# Patient Record
Sex: MALE | Race: WHITE | NOT HISPANIC OR LATINO | Employment: FULL TIME | URBAN - METROPOLITAN AREA
[De-identification: names, ages, dates, MRNs, and addresses within clinical notes are randomized per-mention and may not be internally consistent; named-entity substitution may affect disease eponyms.]

---

## 2023-06-20 ENCOUNTER — EVALUATION (OUTPATIENT)
Age: 48
End: 2023-06-20
Payer: COMMERCIAL

## 2023-06-20 DIAGNOSIS — M17.12 PRIMARY OSTEOARTHRITIS OF LEFT KNEE: ICD-10-CM

## 2023-06-20 DIAGNOSIS — M17.11 PRIMARY OSTEOARTHRITIS OF RIGHT KNEE: ICD-10-CM

## 2023-06-20 DIAGNOSIS — Z96.651 STATUS POST RIGHT PARTIAL KNEE REPLACEMENT: Primary | ICD-10-CM

## 2023-06-20 DIAGNOSIS — M17.0 BILATERAL PRIMARY OSTEOARTHRITIS OF KNEE: ICD-10-CM

## 2023-06-20 PROCEDURE — 97110 THERAPEUTIC EXERCISES: CPT

## 2023-06-20 PROCEDURE — 97112 NEUROMUSCULAR REEDUCATION: CPT

## 2023-06-20 PROCEDURE — 97161 PT EVAL LOW COMPLEX 20 MIN: CPT

## 2023-06-20 NOTE — LETTER
2023    Kevin Pitts MD  [address] [place] [address]    Patient: Abi Cheung   YOB: 1975   Date of Visit: 2023     Encounter Diagnosis     ICD-10-CM    1  Status post right partial knee replacement  Z96 651       2  Bilateral primary osteoarthritis of knee  M17 0       3  Primary osteoarthritis of right knee  M17 11       4  Primary osteoarthritis of left knee  M17 12           Dear Dr Michelle Sat:    Thank you for your recent referral of Abi Cheung  Please review the attached evaluation summary from Talha's recent visit  Please verify that you agree with the plan of care by signing the attached order  If you have any questions or concerns, please do not hesitate to call  I sincerely appreciate the opportunity to share in the care of one of your patients and hope to have another opportunity to work with you in the near future  Sincerely,    Chelly Pond, PT      Referring Provider:      I certify that I have read the below Plan of Care and certify the need for these services furnished under this plan of treatment while under my care  Kevin Pitts MD  [facility]  Via Fax: [phone]          PT Evaluation     Today's date: 2023  Patient name: Abi Cheung  : 1975  MRN: [id number]  Referring provider: Anselmo Odom*  Dx:   Encounter Diagnosis     ICD-10-CM    1  Status post right partial knee replacement  Z96 651       2  Bilateral primary osteoarthritis of knee  M17 0       3  Primary osteoarthritis of right knee  M17 11       4  Primary osteoarthritis of left knee  M17 12           Start Time: 0900  Stop Time: 1000  Total time in clinic (min): 60 minutes    Assessment  Assessment details: Patient is a 45 yo male presenting to physical therapy s/p R partial knee replacement on 23   Patient presents with decreased R knee ROM, decrease hip and knee strength and gait deviations such as decreased knee extension with heel strike and amb with RW  Patient has increased difficulty with walking, standing, stairs and transfers  PT will address the noted impairments by performing hip and knee strengthening, stretching, balance, functional activities and manual techniques to allow the patient to return to his PLOF  PT recommended 2x/week for 6-8 weeks c a good prognosis 2* PLOF  Impairments: abnormal gait, abnormal or restricted ROM, activity intolerance, impaired balance, impaired physical strength, lacks appropriate home exercise program, pain with function, poor posture  and poor body mechanics    Symptom irritability: lowUnderstanding of Dx/Px/POC: good   Prognosis: good    Goals  Post-Operative Goals:  STG: In four weeks the patient will:    1  Be (I) with his HEP  2  Increase hip and knee strength to 4+/5 MMT score to assist c ADLs  3  Increase R knee ROM by 15 degrees to assist c walking and transfers  4  amb with a RW for 200ft with mod (I) with proper gait mechanics  5  amb with a SPC for 200ft with mod (I) with proper gait mechanics  6  Negotiate 14 steps with proper mechanics with a SPC to assist c navigating his second floor of his home       LTG: In eight weeks, the patient will:    1  Increase FOTO score to 68 to demonstrate improvements in symptoms and function  2  Demonstrate full R knee AROM without pain  3  Negotiate a full set of stairs at his home without pain  4  Increase hip and knee strength to 5/5 MMT score to assist c prolonged walking and stairs  5  amb (I) for 200ft with proper gait mechanics  6  Return to work         Plan  Patient would benefit from: skilled physical therapy and PT eval  Planned modality interventions: cryotherapy and thermotherapy: hydrocollator packs  Planned therapy interventions: abdominal trunk stabilization, joint mobilization, manual therapy, massage, Minaya taping, neuromuscular re-education, "patient education, postural training, balance, body mechanics training, breathing training, strengthening, stretching, therapeutic activities, therapeutic exercise, transfer training, home exercise program, gait training, functional ROM exercises and flexibility  Frequency: 2x week  Duration in visits: 16  Duration in weeks: 8  Plan of Care beginning date: 2023  Plan of Care expiration date: 8/15/2023  Treatment plan discussed with: patient        Subjective Evaluation    History of Present Illness  Mechanism of injury: Patient noted since he was young he would always get fluid drawn out of the R knee  Patient noted prior to the surgery he was bone on the medial side of the knee  Patient is s/p R medial partial knee replacement on 23  Patient noted no complications from surgery  Patient currently is having increased difficulty with walking, steps, car and bed transfers  Patient noted no numbness or tingling  Pain  Current pain ratin  At best pain ratin (sitting with it elevated)  At worst pain ratin  Location: R medial knee  Quality: dull ache  Relieving factors: medications  Aggravating factors: standing, walking, stair climbing, sitting and lifting  Progression: worsening    Social Support  Steps to enter house: yes  1  Stairs in house: yes   14  Lives in: multiple-level home  Lives with: spouse and adult children (13 yo daughter)    Employment status: working (Maintenance, 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    Patient Goals  Patient goals for therapy: increased strength, independence with ADLs/IADLs, return to sport/leisure activities, increased motion, improved balance, decreased pain, decreased edema and return to work  Patient goal: \"to return to work  \" \"walk without pain  \" \"to hunt and fish without pain  \"         Objective     Observations     Right Knee   Positive for incision  Negative for drainage and edema       Additional Observation Details  Patient presents with healing " "incision present on the R knee with steri-strips/gauze       Neurological Testing     Sensation     Knee   Left Knee   Intact: light touch    Right Knee   Intact: light touch     Active Range of Motion     Right Knee   Flexion: 85 degrees with pain  Extension: 7 degrees with pain  Extensor la degrees with pain    Additional Active Range of Motion Details  Resting at 10 degrees extension    Passive Range of Motion     Right Knee   Flexion: 86 degrees with pain  Extension: 5 degrees with pain    Mobility   Patellar Mobility:     Right Knee   Hypomobile: medial, lateral, superior and inferior     Strength/Myotome Testing     Left Hip   Planes of Motion   Flexion: 4+  Extension: 4+  Abduction: 4+  Adduction: 4+    Right Hip   Planes of Motion   Flexion: 4  Extension: 4  Abduction: 4  Adduction: 4    Left Knee   Flexion: 5  Extension: 5    Right Knee   Flexion: 3  Extension: 3-  Quadriceps contraction: fair    Left Ankle/Foot   Dorsiflexion: 4-  Plantar flexion: 4-    Right Ankle/Foot   Dorsiflexion: 4  Plantar flexion: 4    Additional Strength Details  Patient able to perform x10 SLR with extensor lag    Patient amb with RW      Flowsheet Rows    Flowsheet Row Most Recent Value   PT/OT G-Codes    Current Score 35   Projected Score 68   Assessment Type Evaluation            Precautions: s/p R partial TKA on 23      Manuals             Patellar mobs nv            R knee PROM nv                                      Neuro Re-Ed             HEP edu MW            SLR x10            Quad set x10  5\" hold            clams nv            bridge nv                                      Ther Ex             X-ride nv            Hamstring stretch nv            Calf stretch 3x30\"            Ankle pumps/ heel raises x10            Heel slides x20            Hip abd supine x10            Hip add nv                         Ther Activity             Sit to stands nv            Step ups nv            Gait Training           " amb with SPC nv                         Modalities

## 2023-06-20 NOTE — PROGRESS NOTES
PT Evaluation     Today's date: 2023  Patient name: Tawanda Helm  : 1975  MRN: [id number]  Referring provider: Gigi Rascon*  Dx:   Encounter Diagnosis     ICD-10-CM    1  Status post right partial knee replacement  Z96 651       2  Bilateral primary osteoarthritis of knee  M17 0       3  Primary osteoarthritis of right knee  M17 11       4  Primary osteoarthritis of left knee  M17 12           Start Time: 0900  Stop Time: 1000  Total time in clinic (min): 60 minutes    Assessment  Assessment details: Patient is a 45 yo male presenting to physical therapy s/p R partial knee replacement on 23  Patient presents with decreased R knee ROM, decrease hip and knee strength and gait deviations such as decreased knee extension with heel strike and amb with RW  Patient has increased difficulty with walking, standing, stairs and transfers  PT will address the noted impairments by performing hip and knee strengthening, stretching, balance, functional activities and manual techniques to allow the patient to return to his PLOF  PT recommended 2x/week for 6-8 weeks c a good prognosis 2* PLOF  Impairments: abnormal gait, abnormal or restricted ROM, activity intolerance, impaired balance, impaired physical strength, lacks appropriate home exercise program, pain with function, poor posture  and poor body mechanics    Symptom irritability: lowUnderstanding of Dx/Px/POC: good   Prognosis: good    Goals  Post-Operative Goals:  STG: In four weeks the patient will:    1  Be (I) with his HEP  2  Increase hip and knee strength to 4+/5 MMT score to assist c ADLs  3  Increase R knee ROM by 15 degrees to assist c walking and transfers  4  amb with a RW for 200ft with mod (I) with proper gait mechanics  5  amb with a SPC for 200ft with mod (I) with proper gait mechanics     6  Negotiate 14 steps with proper mechanics with a SPC to assist c navigating his second floor of his home       LTG: In eight weeks, the patient will:    1  Increase FOTO score to 68 to demonstrate improvements in symptoms and function  2  Demonstrate full R knee AROM without pain  3  Negotiate a full set of stairs at his home without pain  4  Increase hip and knee strength to 5/5 MMT score to assist c prolonged walking and stairs  5  amb (I) for 200ft with proper gait mechanics  6  Return to work  Plan  Patient would benefit from: skilled physical therapy and PT eval  Planned modality interventions: cryotherapy and thermotherapy: hydrocollator packs  Planned therapy interventions: abdominal trunk stabilization, joint mobilization, manual therapy, massage, Minaya taping, neuromuscular re-education, patient education, postural training, balance, body mechanics training, breathing training, strengthening, stretching, therapeutic activities, therapeutic exercise, transfer training, home exercise program, gait training, functional ROM exercises and flexibility  Frequency: 2x week  Duration in visits: 16  Duration in weeks: 8  Plan of Care beginning date: 2023  Plan of Care expiration date: 8/15/2023  Treatment plan discussed with: patient        Subjective Evaluation    History of Present Illness  Mechanism of injury: Patient noted since he was young he would always get fluid drawn out of the R knee  Patient noted prior to the surgery he was bone on the medial side of the knee  Patient is s/p R medial partial knee replacement on 23  Patient noted no complications from surgery  Patient currently is having increased difficulty with walking, steps, car and bed transfers  Patient noted no numbness or tingling     Pain  Current pain ratin  At best pain ratin (sitting with it elevated)  At worst pain ratin  Location: R medial knee  Quality: dull ache  Relieving factors: medications  Aggravating factors: standing, walking, stair climbing, sitting and lifting  Progression: worsening    Social Support  Steps to enter "house: yes  1  Stairs in house: yes   14  Lives in: multiple-level home  Lives with: spouse and adult children (13 yo daughter)    Employment status: working (Maintenance, 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    Patient Goals  Patient goals for therapy: increased strength, independence with ADLs/IADLs, return to sport/leisure activities, increased motion, improved balance, decreased pain, decreased edema and return to work  Patient goal: \"to return to work  \" \"walk without pain  \" \"to hunt and fish without pain  \"         Objective     Observations     Right Knee   Positive for incision  Negative for drainage and edema  Additional Observation Details  Patient presents with healing incision present on the R knee with steri-strips/gauze       Neurological Testing     Sensation     Knee   Left Knee   Intact: light touch    Right Knee   Intact: light touch     Active Range of Motion     Right Knee   Flexion: 85 degrees with pain  Extension: 7 degrees with pain  Extensor la degrees with pain    Additional Active Range of Motion Details  Resting at 10 degrees extension    Passive Range of Motion     Right Knee   Flexion: 86 degrees with pain  Extension: 5 degrees with pain    Mobility   Patellar Mobility:     Right Knee   Hypomobile: medial, lateral, superior and inferior     Strength/Myotome Testing     Left Hip   Planes of Motion   Flexion: 4+  Extension: 4+  Abduction: 4+  Adduction: 4+    Right Hip   Planes of Motion   Flexion: 4  Extension: 4  Abduction: 4  Adduction: 4    Left Knee   Flexion: 5  Extension: 5    Right Knee   Flexion: 3  Extension: 3-  Quadriceps contraction: fair    Left Ankle/Foot   Dorsiflexion: 4-  Plantar flexion: 4-    Right Ankle/Foot   Dorsiflexion: 4  Plantar flexion: 4    Additional Strength Details  Patient able to perform x10 SLR with extensor lag    Patient amb with RW      Flowsheet Rows    Flowsheet Row Most Recent Value   PT/OT G-Codes    Current Score 35   Projected Score 68 " "  Assessment Type Evaluation             Precautions: s/p R partial TKA on 6/20/23      Manuals 6/20            Patellar mobs nv            R knee PROM nv                                      Neuro Re-Ed             HEP edu MW            SLR x10            Quad set x10  5\" hold            clams nv            bridge nv                                      Ther Ex             X-ride nv            Hamstring stretch nv            Calf stretch 3x30\"            Ankle pumps/ heel raises x10            Heel slides x20            Hip abd supine x10            Hip add nv                         Ther Activity             Sit to stands nv            Step ups nv            Gait Training             amb with SPC nv                         Modalities                                          "

## 2023-06-23 ENCOUNTER — OFFICE VISIT (OUTPATIENT)
Age: 48
End: 2023-06-23
Payer: COMMERCIAL

## 2023-06-23 DIAGNOSIS — Z96.651 STATUS POST RIGHT PARTIAL KNEE REPLACEMENT: Primary | ICD-10-CM

## 2023-06-23 DIAGNOSIS — M17.0 BILATERAL PRIMARY OSTEOARTHRITIS OF KNEE: ICD-10-CM

## 2023-06-23 DIAGNOSIS — M17.11 PRIMARY OSTEOARTHRITIS OF RIGHT KNEE: ICD-10-CM

## 2023-06-23 DIAGNOSIS — M17.12 PRIMARY OSTEOARTHRITIS OF LEFT KNEE: ICD-10-CM

## 2023-06-23 PROCEDURE — 97112 NEUROMUSCULAR REEDUCATION: CPT

## 2023-06-23 PROCEDURE — 97110 THERAPEUTIC EXERCISES: CPT

## 2023-06-23 RX ORDER — ROSUVASTATIN CALCIUM 20 MG/1
20 TABLET, COATED ORAL DAILY
COMMUNITY

## 2023-06-23 RX ORDER — OMEGA-3S/DHA/EPA/FISH OIL/D3 300MG-1000
400 CAPSULE ORAL DAILY
COMMUNITY

## 2023-06-23 RX ORDER — EZETIMIBE 10 MG/1
10 TABLET ORAL DAILY
COMMUNITY

## 2023-06-23 NOTE — PROGRESS NOTES
"Daily Note     Today's date: 2023  Patient name: Tawanda Helm  : 1975  MRN: [id number]  Referring provider: Gigi Rascon*  Dx:   Encounter Diagnosis     ICD-10-CM    1  Status post right partial knee replacement  Z96 651       2  Bilateral primary osteoarthritis of knee  M17 0       3  Primary osteoarthritis of right knee  M17 11       4  Primary osteoarthritis of left knee  M17 12           Start Time: 945  Stop Time: 0  Total time in clinic (min): 45 minutes    Subjective: Patient noted after last session his nerve block wore off and he had increased pain  Patient is now taking his pain medicine on a routine basis  Patient noted a 4/10 pain at the start of the session  Objective: See treatment diary below      Assessment: Patient performed X-ride  aerobic exercise to increase blood flow to the area being treated, prepare the muscles for strength training and stretching, improve overall tolerance to activity, and aerobic endurance  PT introduced seated marching and LAQ to assist c ROM and strength  Patient required min VCs for form throughout the session  Patient improved with ROM during the session after manual PROM  Patient had changed his dressing at home yesterday and presented to the clinic with gauze over the healing incision  PT added to his HEP  Patient would benefit from continued PT to allow the patient to return to his PLOF  Plan: Continue per plan of care        Precautions: s/p R partial TKA on 23      Manuals            Patellar mobs nv            R knee PROM nv MW                                     Neuro Re-Ed             HEP edu EVELIO FRASER           SLR x10 x10 ea           Quad set x10  5\" hold x20  5\" hold           clams nv            bridge nv                                      Ther Ex             X-ride nv 10' rocking           Hamstring stretch nv            Calf stretch 3x30\" 3x30\"            Ankle pumps/ heel raises x10          " "  Heel slides x20 x20  5\" hold           Hip abd supine x10            Hip add nv            Seated marching  x10 ea           LAQ with other foot assist/ seated knee flexion with other foot assistance  x10           Ther Activity             Sit to stands nv            Step ups nv            Gait Training             amb with SPC nv RW  1 lap                        Modalities             CP  10'                             "

## 2023-06-26 ENCOUNTER — OFFICE VISIT (OUTPATIENT)
Age: 48
End: 2023-06-26
Payer: COMMERCIAL

## 2023-06-26 DIAGNOSIS — M17.12 PRIMARY OSTEOARTHRITIS OF LEFT KNEE: ICD-10-CM

## 2023-06-26 DIAGNOSIS — M17.11 PRIMARY OSTEOARTHRITIS OF RIGHT KNEE: ICD-10-CM

## 2023-06-26 DIAGNOSIS — M17.0 BILATERAL PRIMARY OSTEOARTHRITIS OF KNEE: ICD-10-CM

## 2023-06-26 DIAGNOSIS — Z96.651 STATUS POST RIGHT PARTIAL KNEE REPLACEMENT: Primary | ICD-10-CM

## 2023-06-26 PROCEDURE — 97112 NEUROMUSCULAR REEDUCATION: CPT

## 2023-06-26 PROCEDURE — 97110 THERAPEUTIC EXERCISES: CPT

## 2023-06-26 PROCEDURE — 97530 THERAPEUTIC ACTIVITIES: CPT

## 2023-06-26 NOTE — PROGRESS NOTES
"Daily Note     Today's date: 2023  Patient name: Otilia Bowser  : 1975  MRN: [id number]  Referring provider: Asuncion Manzanares*  Dx:   Encounter Diagnosis     ICD-10-CM    1  Status post right partial knee replacement  Z96 651       2  Bilateral primary osteoarthritis of knee  M17 0       3  Primary osteoarthritis of right knee  M17 11       4  Primary osteoarthritis of left knee  M17 12           Start Time: 1130  Stop Time: 1233  Total time in clinic (min): 63 minutes    Subjective: Patient noted a 5/10 pain at the start of the session  Patient noted that he took multiple short walks over the weekend and presents with amb with a small base quad cane for his AD  Objective: See treatment diary below      Assessment: Patient performed Nustep aerobic exercise to increase blood flow to the area being treated, prepare the muscles for strength training and stretching, improve overall tolerance to activity, and aerobic endurance  Patient negotiate a 4\" and 6\" step up and down during the session with his cane  Patient was able to negotiate steps with minimal cues and safely  PT educated the patient on negotiating steps at home  PT introduced hip add and abduction in a sidelying position to assist c gait mechanics  Patient required min VCs for proper cane negotiation  Patient continues to improve with ROM after manual PROM  PT educated the patient on his HEP  Patient would benefit from continued PT to allow the patient to return to his PLOF  Plan: Continue per plan of care        Precautions: s/p R partial TKA on 23  R knee AROM as of 23: 0-106 degrees  R knee PROM as of 23: 0-116 degrees    Manuals           Patellar mobs nv            R knee PROM nv MW MW                                    Neuro Re-Ed             HEP edu MW MW MW          SLR x10 x10 ea x15 ea          Quad set x10  5\" hold x20  5\" hold           clams nv            bridge nv              " "                        Ther Ex             X-ride nv 10' rocking Nu step  10'  Seat 10          Hamstring stretch nv            Calf stretch 3x30\" 3x30\"            Ankle pumps/ heel raises x10            Heel slides x20 x20  5\" hold x20  5\" hold          Hip abd supine x10  sidelying  x10 ea          Hip add nv  x20  5\" hold          Seated marching  x10 ea x20 ea          LAQ with other foot assist/ seated knee flexion with other foot assistance  x10 x20          Ther Activity             Sit to stands nv            Step ups nv  Fwd ad down  4\" and 6\"  x20 ea, cane edu          Gait Training             amb with SPC nv RW  1 lap 1 lap SPC with cues                       Modalities             CP  10' 10'                            "

## 2023-06-29 ENCOUNTER — OFFICE VISIT (OUTPATIENT)
Age: 48
End: 2023-06-29
Payer: COMMERCIAL

## 2023-06-29 DIAGNOSIS — M17.11 PRIMARY OSTEOARTHRITIS OF RIGHT KNEE: ICD-10-CM

## 2023-06-29 DIAGNOSIS — M17.0 BILATERAL PRIMARY OSTEOARTHRITIS OF KNEE: ICD-10-CM

## 2023-06-29 DIAGNOSIS — M17.12 PRIMARY OSTEOARTHRITIS OF LEFT KNEE: ICD-10-CM

## 2023-06-29 DIAGNOSIS — Z96.651 STATUS POST RIGHT PARTIAL KNEE REPLACEMENT: Primary | ICD-10-CM

## 2023-06-29 PROCEDURE — 97140 MANUAL THERAPY 1/> REGIONS: CPT

## 2023-06-29 PROCEDURE — 97110 THERAPEUTIC EXERCISES: CPT

## 2023-06-29 PROCEDURE — 97530 THERAPEUTIC ACTIVITIES: CPT

## 2023-06-29 NOTE — PROGRESS NOTES
"Daily Note     Today's date: 2023  Patient name: José Miguel Acosta  : 1975  MRN: [id number]  Referring provider: Navid Guerra*  Dx:   Encounter Diagnosis     ICD-10-CM    1  Status post right partial knee replacement  Z96 651       2  Bilateral primary osteoarthritis of knee  M17 0       3  Primary osteoarthritis of right knee  M17 11       4  Primary osteoarthritis of left knee  M17 12           Start Time: 1745  Stop Time: 1830  Total time in clinic (min): 45 minutes    Subjective: Patient noted a 3/10 pain at the start of the session  Patient noted that he followed up with his surgeon and they noted good progress  Patient noted he is walking with the [facility]  Patient did not try the steps at home yet  Objective: See treatment diary below      Assessment: Patient performed Nustep aerobic exercise to increase blood flow to the area being treated, prepare the muscles for strength training and stretching, improve overall tolerance to activity, and aerobic endurance  Patient performed step downs with 2 HHA and noted some pain  Patient performed sit to stands with min VCs for form  PT educated the patient on steps at home and his HEP  Patient would benefit from continued PT to allow the patient to return to his PLOF  Plan: Continue per plan of care        Precautions: s/p R partial TKA on 23  R knee AROM as of 23: 0-106 degrees  R knee PROM as of 23: 0-116 degrees    Manuals          Patellar mobs nv   MW  Grade  III         R knee PROM nv MW MW MW                                   Neuro Re-Ed             HEP edu MW MW MW MW         SLR x10 x10 ea x15 ea 2x10 ea         Quad set x10  5\" hold x20  5\" hold           clams nv            bridge nv                                      Ther Ex             X-ride nv 10' rocking Nu step  10'  Seat 10 Nu step  10' seat 9         Hamstring stretch nv            Calf stretch 3x30\" 3x30\"   @ foam roll  3x30\"     " "     Ankle pumps/ heel raises x10            Heel slides x20 x20  5\" hold x20  5\" hold x20  5\" hold         Hip abd supine x10  sidelying  x10 ea          Hip add nv  x20  5\" hold          Seated marching  x10 ea x20 ea x20 ea         LAQ with other foot assist/ seated knee flexion with other foot assistance  x10 x20 x20         Ther Activity             Sit to stands nv   x10 2 HHA         Step ups nv  Fwd ad down  4\" and 6\"  x20 ea, cane edu Fwd, down, lateral  6\"  x20 ea (B)         Gait Training             amb with SPC nv RW  1 lap 1 lap SPC with cues                       Modalities             CP  10' 10'                            "

## 2023-07-03 ENCOUNTER — OFFICE VISIT (OUTPATIENT)
Age: 48
End: 2023-07-03
Payer: COMMERCIAL

## 2023-07-03 DIAGNOSIS — M17.0 BILATERAL PRIMARY OSTEOARTHRITIS OF KNEE: ICD-10-CM

## 2023-07-03 DIAGNOSIS — M17.12 PRIMARY OSTEOARTHRITIS OF LEFT KNEE: ICD-10-CM

## 2023-07-03 DIAGNOSIS — Z96.651 STATUS POST RIGHT PARTIAL KNEE REPLACEMENT: Primary | ICD-10-CM

## 2023-07-03 DIAGNOSIS — M17.11 PRIMARY OSTEOARTHRITIS OF RIGHT KNEE: ICD-10-CM

## 2023-07-03 PROCEDURE — 97110 THERAPEUTIC EXERCISES: CPT

## 2023-07-03 NOTE — PROGRESS NOTES
Daily Note     Today's date: 7/3/2023  Patient name: Whitley Kelley  : 1975  MRN: [id number]  Referring provider: Dinesh Stanton*  Dx:   Encounter Diagnosis     ICD-10-CM    1. Status post right partial knee replacement  Z96.651       2. Bilateral primary osteoarthritis of knee  M17.0       3. Primary osteoarthritis of right knee  M17.11       4. Primary osteoarthritis of left knee  M17.12           Start Time: 1745  Stop Time: 1830  Total time in clinic (min): 45 minutes    Subjective: Patient noted he has a 4/10 pain at the start of the session. Patient went on walks this past weekend and was able to perform the step to pattern on the stairs this weekend. Objective: See treatment diary below      Assessment: Patient performed Nustep aerobic exercise to increase blood flow to the area being treated, prepare the muscles for strength training and stretching, improve overall tolerance to activity, and aerobic endurance. Patient required min VCs for form during the session. Patient performed sit to stands with 2 HHA. Patient performed 1 sit to stand without HHA and noted increased pain in the R knee. Patient was able to complete, however it was painful. PT introduced the leg press & prone quad stretch to assist c strength and ROM. Patient noted no increased pain with the leg press. PT educated the patient on his HEP. Patient would benefit from continued PT to allow the patient to return to his PLOF. Plan: Continue per plan of care.       Precautions: s/p R partial TKA on 23  R knee AROM as of 23: 0-106 degrees  R knee PROM as of 23: 0-116 degrees    Manuals  7/3        Patellar mobs nv   MW  Grade  III MW  Grade  III        R knee PROM nv MW MW MW MW                                  Neuro Re-Ed             HEP edu MW MW MW MW MW        SLR x10 x10 ea x15 ea 2x10 ea         Quad set x10  5" hold x20  5" hold           clams nv            bridge nv Ther Ex             X-ride nv 10' rocking Nu step  10'  Seat 10 Nu step  10' seat 9 Nu step  10' seat 8        Hamstring stretch nv    @ step  3x30" ea        Calf stretch 3x30" 3x30"   @ foam roll  3x30"  @ step  3x30" ea        Ankle pumps/ heel raises x10    HR  x20        Prone quad stetch     5x20"        Heel slides x20 x20  5" hold x20  5" hold x20  5" hold         Hip abd supine x10  sidelying  x10 ea          Hip add nv  x20  5" hold          Seated marching  x10 ea x20 ea x20 ea         Leg press     DL  65#  x20  SL  45#  x20 ea          LAQ with other foot assist/ seated knee flexion with other foot assistance  x10 x20 x20 x20        Ther Activity             Sit to stands nv   x10 2 HHA x10  2 HHA        Step ups nv  Fwd ad down  4" and 6"  x20 ea, cane edu Fwd, down, lateral  6"  x20 ea (B)         Gait Training             amb with SPC nv RW  1 lap 1 lap SPC with cues                       Modalities             CP  10' 10'

## 2023-07-06 ENCOUNTER — OFFICE VISIT (OUTPATIENT)
Age: 48
End: 2023-07-06
Payer: COMMERCIAL

## 2023-07-06 DIAGNOSIS — Z96.651 STATUS POST RIGHT PARTIAL KNEE REPLACEMENT: Primary | ICD-10-CM

## 2023-07-06 DIAGNOSIS — M17.0 BILATERAL PRIMARY OSTEOARTHRITIS OF KNEE: ICD-10-CM

## 2023-07-06 DIAGNOSIS — M17.12 PRIMARY OSTEOARTHRITIS OF LEFT KNEE: ICD-10-CM

## 2023-07-06 DIAGNOSIS — M17.11 PRIMARY OSTEOARTHRITIS OF RIGHT KNEE: ICD-10-CM

## 2023-07-06 PROCEDURE — 97530 THERAPEUTIC ACTIVITIES: CPT

## 2023-07-06 PROCEDURE — 97110 THERAPEUTIC EXERCISES: CPT

## 2023-07-06 NOTE — PROGRESS NOTES
Daily Note     Today's date: 2023  Patient name: Kate Kamara  : 1975  MRN: [id number]  Referring provider: Jeannie Brock*  Dx:   Encounter Diagnosis     ICD-10-CM    1. Status post right partial knee replacement  Z96.651       2. Bilateral primary osteoarthritis of knee  M17.0       3. Primary osteoarthritis of right knee  M17.11       4. Primary osteoarthritis of left knee  M17.12           Start Time: 1745  Stop Time:   Total time in clinic (min): 45 minutes    Subjective: Patient noted a 3/10 pain at the start of the session. Patient noted that he is not using the cane in his house. Patient only uses the cane with long distances out side. Objective: See treatment diary below      Assessment: Patient performed Nustep aerobic exercise to increase blood flow to the area being treated, prepare the muscles for strength training and stretching, improve overall tolerance to activity, and aerobic endurance. PT introduced TRX squats to assist c sit to stands. Patient required cues for knee flexion and weight distribution. Patient performed exercises with min VCs form, however is progressing. Patient amb without an AD with decreased heel strike. With cues this decreased. PT added to his HEP. Patient would benefit from continued PT to allow the patient to return to his PLOF. Plan: Continue per plan of care.       Precautions: s/p R partial TKA on 23  R knee AROM as of 23: 0-106 degrees  R knee PROM as of 23: 0-116 degrees    Manuals  7/3 7/6       Patellar mobs nv   MW  Grade  III MW  Grade  III MW  grade  III       R knee PROM nv MW MW MW MW MW       Quad rolling      MW edu                    Neuro Re-Ed             HEP edu MW MW MW MW MW MW       SLR x10 x10 ea x15 ea 2x10 ea         Quad set x10  5" hold x20  5" hold           clams nv            bridge nv                                      Ther Ex             X-ride nv 10' rocking Nu step  10'  Seat 10 Nu step  10' seat 9 Nu step  10' seat 8 Nu step  10' seat 8       Hamstring stretch nv    @ step  3x30" ea @ step  3x30" ea       Calf stretch 3x30" 3x30"   @ foam roll  3x30"  @ step  3x30" ea @ step  3x30" ea       Ankle pumps/ heel raises x10    HR  x20        Prone quad stetch     5x20" 5x20"       Heel slides x20 x20  5" hold x20  5" hold x20  5" hold  10x10" hold       Hip abd supine x10  sidelying  x10 ea          Hip add nv  x20  5" hold          Seated marching  x10 ea x20 ea x20 ea         Leg press     DL  65#  x20  SL  45#  x20 ea   DL  65#  x25  SL  45#  x20 ea  Plate 9       LAQ with other foot assist/ seated knee flexion with other foot assistance  x10 x20 x20 x20        Ther Activity             Sit to stands nv   x10 2 HHA x10  2 HHA        TRX squats      2x10       Step ups nv  Fwd ad down  4" and 6"  x20 ea, cane edu Fwd, down, lateral  6"  x20 ea (B)         Gait Training             amb with SPC nv RW  1 lap 1 lap SPC with cues   2 laps no AD                    Modalities             CP  10' 10'

## 2023-07-10 ENCOUNTER — EVALUATION (OUTPATIENT)
Age: 48
End: 2023-07-10
Payer: COMMERCIAL

## 2023-07-10 DIAGNOSIS — M17.11 PRIMARY OSTEOARTHRITIS OF RIGHT KNEE: ICD-10-CM

## 2023-07-10 DIAGNOSIS — M17.0 BILATERAL PRIMARY OSTEOARTHRITIS OF KNEE: ICD-10-CM

## 2023-07-10 DIAGNOSIS — Z96.651 STATUS POST RIGHT PARTIAL KNEE REPLACEMENT: Primary | ICD-10-CM

## 2023-07-10 DIAGNOSIS — M17.12 PRIMARY OSTEOARTHRITIS OF LEFT KNEE: ICD-10-CM

## 2023-07-10 PROCEDURE — 97164 PT RE-EVAL EST PLAN CARE: CPT

## 2023-07-10 PROCEDURE — 97140 MANUAL THERAPY 1/> REGIONS: CPT

## 2023-07-10 PROCEDURE — 97110 THERAPEUTIC EXERCISES: CPT

## 2023-07-10 NOTE — PROGRESS NOTES
PT Re-Evaluation     Today's date: 7/10/2023  Patient name: Awanda Koyanagi  : 1975  MRN: [id number]  Referring provider: Juvencio Hernández*  Dx:   Encounter Diagnosis     ICD-10-CM    1. Status post right partial knee replacement  Z96.651       2. Bilateral primary osteoarthritis of knee  M17.0       3. Primary osteoarthritis of right knee  M17.11       4. Primary osteoarthritis of left knee  M17.12           Start Time: 1745  Stop Time: 1830  Total time in clinic (min): 45 minutes    Assessment  Assessment details: Patient is a 47 yo male presenting to physical therapy s/p R partial knee replacement on 23. Since starting physical therarpy the patient has improved with strength, endurance, gait mechanics and activity tolerance. Patient is amb without an AD at home and in the community. Patient may use a SPC with amb in the yard at times. Patient is able to negotiate the steps with one hand rail in a receiprocal to step to pattern. Descending is more challenging. Patient requires 1 HHA for sit to stand transfers. Patient noted increased stiffness with prolonged sitting. Although improvements have been made the patient continues to have difficulty with ROM, strength, endurance, sit to stand transfers and prolonged walking. Patient continues to perform his HEP on a daily basis. PT will continue to address the noted impairments by performing hip and knee strengthening, stretching, balance, functional activities and manual techniques to allow the patient to return to his PLOF. PT recommended 2x/week for 4-6 weeks c a good prognosis 2* PLOF.     Impairments: abnormal gait, abnormal or restricted ROM, activity intolerance, impaired balance, impaired physical strength, lacks appropriate home exercise program, pain with function, poor posture  and poor body mechanics    Symptom irritability: lowUnderstanding of Dx/Px/POC: good   Prognosis: good    Goals  Post-Operative Goals:  STG: In four weeks the patient will:    1. Be (I) with his HEP. (in progress)  2. Increase hip and knee strength to 4+/5 MMT score to assist c ADLs. (in progress)  3. Increase R knee ROM by 15 degrees to assist c walking and transfers. (MET)  4. amb with a RW for 200ft with mod (I) with proper gait mechanics. (MET)  5. amb with a SPC for 200ft with mod (I) with proper gait mechanics. (MET)  6. Negotiate 14 steps with proper mechanics with a SPC to assist c navigating his second floor of his home (MET)      LTG: In eight weeks, the patient will:    1. Increase FOTO score to 68 to demonstrate improvements in symptoms and function. (in progress)  2. Demonstrate full R knee AROM without pain.(in progress)  3. Negotiate a full set of stairs at his home without pain.(in progress)  4. Increase hip and knee strength to 5/5 MMT score to assist c prolonged walking and stairs. (in progress)  5. amb (I) for 200ft with proper gait mechanics. (in progress)  6. Return to work. (in progress)      Plan  Patient would benefit from: skilled physical therapy and PT eval  Planned modality interventions: cryotherapy and thermotherapy: hydrocollator packs  Planned therapy interventions: abdominal trunk stabilization, joint mobilization, manual therapy, massage, Minaya taping, neuromuscular re-education, patient education, postural training, balance, body mechanics training, breathing training, strengthening, stretching, therapeutic activities, therapeutic exercise, transfer training, home exercise program, gait training, functional ROM exercises and flexibility  Frequency: 2x week  Duration in visits: 12  Duration in weeks: 6  Plan of Care beginning date: 7/10/2023  Plan of Care expiration date: 8/21/2023  Treatment plan discussed with: patient        Subjective Evaluation    History of Present Illness  Mechanism of injury: Patient is s/p R medial partial knee replacement on 6/19/23. Patient noted a 50% improvement since starting physical therapy.  Patient noted improvements with the following since starting PT: donning/doffing shoes, taking a shower, walking, standing, sleep. Patient noted he continues to be limited in descending the stairs, prolonged walking, standing and sitting, and sit to stands. Patient is able to ascend the stairs in a reciprocal pattern with one hand rail, descend the stairs reciprocal/ step to pattern with one hand rail, sit to stand transfers with one hand. Patient is able to sleep for 7 hours in a row. Patient noted he can sit for about 1-2 hours prior to getting up and moving around. Patient is able to walk the whole grocery store with some discomfort. Patient noted no difficulty with standing for period of time. Patient is still taking a percocet prior to PT, other than that he is not taking pain medications. Patient Goals  Patient goals for therapy: increased strength, independence with ADLs/IADLs, return to sport/leisure activities, increased motion, improved balance, decreased pain and return to work  Patient goal: "to return to work." (RTW in September) "walk without pain." (in progress) "to hunt and fish without pain." (in progress)  Pain  Current pain ratin  At best pain ratin  At worst pain ratin  Location: R medial knee  Quality: dull ache  Relieving factors: medications  Aggravating factors: standing, walking, stair climbing, sitting and lifting  Progression: improved    Social Support  Steps to enter house: yes  1  Stairs in house: yes   14  Lives in: multiple-level home  Lives with: spouse and adult children (13 yo daughter)    Employment status: working (Maintenance, 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          Objective     Observations     Right Knee   Negative for drainage, edema and incision. Additional Observation Details  Patient presents with healed incision present on the R knee with no steri-strips/gauze.          Neurological Testing     Sensation     Knee   Left Knee   Intact: light touch    Right Knee   Intact: light touch     Active Range of Motion     Right Knee   Flexion: 125 degrees with pain  Extension: 0 degrees   Extensor la degrees     Passive Range of Motion     Right Knee   Flexion: 125 degrees with pain  Extension: 0 degrees     Mobility   Patellar Mobility:     Right Knee   WFL: medial and lateral  Hypomobile: superior and inferior     Strength/Myotome Testing     Left Hip   Planes of Motion   Flexion: 4+  Extension: 4+  Abduction: 4+  Adduction: 4+    Right Hip   Planes of Motion   Flexion: 4+  Extension: 4+  Abduction: 4  Adduction: 4+    Left Knee   Flexion: 5  Extension: 5    Right Knee   Flexion: 4  Extension: 4  Quadriceps contraction: good    Left Ankle/Foot   Dorsiflexion: 4-  Plantar flexion: 4-    Right Ankle/Foot   Dorsiflexion: 4  Plantar flexion: 4    Additional Strength Details  Patient able to perform x10 SLR with extensor lag    Patient amb with RW             Precautions: s/p R partial TKA on 23  R knee AROM as of 7/10/23: 0-125 degrees  R knee PROM as of 7/10/23: 0-125 degrees    Manuals 6/20 6/23 6/26 6/29 7/3 7/6 7/10      Patellar mobs nv   MW  Grade  III MW  Grade  III MW  grade  III MW  grade  III      R knee PROM nv MW MW MW MW MW MW      RE       MW      Quad rolling      MW edu       Scar massage       Only superior healed portion  MW      Neuro Re-Ed             HEP edu MW MW MW MW MW MW MW      SLR x10 x10 ea x15 ea 2x10 ea         Quad set x10  5" hold x20  5" hold           clams nv            bridge nv                                      Ther Ex             X-ride nv 10' rocking Nu step  10'  Seat 10 Nu step  10' seat 9 Nu step  10' seat 8 Nu step  10' seat 8 Nu step  10' seat 8      Hamstring stretch nv    @ step  3x30" ea @ step  3x30" ea @ step  3x30" ea      Calf stretch 3x30" 3x30"   @ foam roll  3x30"  @ step  3x30" ea @ step  3x30" ea @ step  3x30" ea      Ankle pumps/ heel raises x10    HR  x20        Prone quad stetch     5x20" 5x20" 5x20"      Heel slides x20 x20  5" hold x20  5" hold x20  5" hold  10x10" hold 10x10" hold      Hip abd supine x10  sidelying  x10 ea          Hip add nv  x20  5" hold          Seated marching  x10 ea x20 ea x20 ea         Leg press     DL  65#  x20  SL  45#  x20 ea   DL  65#  x25  SL  45#  x20 ea  Plate 9 DL  18#  G93  SL  55#  x20 ea  Plate 9 Plate 8     LAQ with other foot assist/ seated knee flexion with other foot assistance  x10 x20 x20 x20  nv with weight      Ther Activity             Sit to stands nv   x10 2 HHA x10  2 HHA  nv      TRX squats      2x10 x20      Step ups nv  Fwd ad down  4" and 6"  x20 ea, cane edu Fwd, down, lateral  6"  x20 ea (B)   nv      Gait Training             amb with SPC nv RW  1 lap 1 lap SPC with cues   2 laps no AD                    Modalities             CP  10' 10'

## 2023-07-10 NOTE — LETTER
July 10, 2023    Vance Martinez MD  [address] [place] [id number]    Patient: Miguel Zamora   YOB: 1975   Date of Visit: 7/10/2023     Encounter Diagnosis     ICD-10-CM    1. Status post right partial knee replacement  Z96.651       2. Bilateral primary osteoarthritis of knee  M17.0       3. Primary osteoarthritis of right knee  M17.11       4. Primary osteoarthritis of left knee  M17.12           Dear Dr. Fredi Bhatt:    Thank you for your recent referral of Miguel Zamora. Please review the attached evaluation summary from Talha's recent visit. Please verify that you agree with the plan of care by signing the attached order. If you have any questions or concerns, please do not hesitate to call. I sincerely appreciate the opportunity to share in the care of one of your patients and hope to have another opportunity to work with you in the near future. Sincerely,    Sarwat Stein, PT      Referring Provider:      I certify that I have read the below Plan of Care and certify the need for these services furnished under this plan of treatment while under my care. Vance Martinez MD  [id number]  Via Fax: [phone]          PT Re-Evaluation     Today's date: 7/10/2023  Patient name: Miguel Zamora  : 1975  MRN: [id number]  Referring provider: Vance Martinez*  Dx:   Encounter Diagnosis     ICD-10-CM    1. Status post right partial knee replacement  Z96.651       2. Bilateral primary osteoarthritis of knee  M17.0       3. Primary osteoarthritis of right knee  M17.11       4. Primary osteoarthritis of left knee  M17.12           Start Time: 1745  Stop Time: 1830  Total time in clinic (min): 45 minutes    Assessment  Assessment details: Patient is a 45 yo male presenting to physical therapy s/p R partial knee replacement on 23.  Since starting physical therarpy the patient has improved with strength, endurance, gait mechanics and activity tolerance. Patient is amb without an AD at home and in the community. Patient may use a SPC with amb in the yard at times. Patient is able to negotiate the steps with one hand rail in a receiprocal to step to pattern. Descending is more challenging. Patient requires 1 HHA for sit to stand transfers. Patient noted increased stiffness with prolonged sitting. Although improvements have been made the patient continues to have difficulty with ROM, strength, endurance, sit to stand transfers and prolonged walking. Patient continues to perform his HEP on a daily basis. PT will continue to address the noted impairments by performing hip and knee strengthening, stretching, balance, functional activities and manual techniques to allow the patient to return to his PLOF. PT recommended 2x/week for 4-6 weeks c a good prognosis 2* PLOF. Impairments: abnormal gait, abnormal or restricted ROM, activity intolerance, impaired balance, impaired physical strength, lacks appropriate home exercise program, pain with function, poor posture  and poor body mechanics    Symptom irritability: lowUnderstanding of Dx/Px/POC: good   Prognosis: good    Goals  Post-Operative Goals:  STG: In four weeks the patient will:    1. Be (I) with his HEP. (in progress)  2. Increase hip and knee strength to 4+/5 MMT score to assist c ADLs. (in progress)  3. Increase R knee ROM by 15 degrees to assist c walking and transfers. (MET)  4. amb with a RW for 200ft with mod (I) with proper gait mechanics. (MET)  5. amb with a SPC for 200ft with mod (I) with proper gait mechanics. (MET)  6. Negotiate 14 steps with proper mechanics with a SPC to assist c navigating his second floor of his home (MET)      LTG: In eight weeks, the patient will:    1. Increase FOTO score to 68 to demonstrate improvements in symptoms and function. (in progress)  2. Demonstrate full R knee AROM without pain.(in progress)  3. Negotiate a full set of stairs at his home without pain.(in progress)  4. Increase hip and knee strength to 5/5 MMT score to assist c prolonged walking and stairs. (in progress)  5. amb (I) for 200ft with proper gait mechanics. (in progress)  6. Return to work. (in progress)      Plan  Patient would benefit from: skilled physical therapy and PT eval  Planned modality interventions: cryotherapy and thermotherapy: hydrocollator packs  Planned therapy interventions: abdominal trunk stabilization, joint mobilization, manual therapy, massage, Minaya taping, neuromuscular re-education, patient education, postural training, balance, body mechanics training, breathing training, strengthening, stretching, therapeutic activities, therapeutic exercise, transfer training, home exercise program, gait training, functional ROM exercises and flexibility  Frequency: 2x week  Duration in visits: 12  Duration in weeks: 6  Plan of Care beginning date: 7/10/2023  Plan of Care expiration date: 8/21/2023  Treatment plan discussed with: patient        Subjective Evaluation    History of Present Illness  Mechanism of injury: Patient is s/p R medial partial knee replacement on 6/19/23. Patient noted a 50% improvement since starting physical therapy. Patient noted improvements with the following since starting PT: donning/doffing shoes, taking a shower, walking, standing, sleep. Patient noted he continues to be limited in descending the stairs, prolonged walking, standing and sitting, and sit to stands. Patient is able to ascend the stairs in a reciprocal pattern with one hand rail, descend the stairs reciprocal/ step to pattern with one hand rail, sit to stand transfers with one hand. Patient is able to sleep for 7 hours in a row. Patient noted he can sit for about 1-2 hours prior to getting up and moving around. Patient is able to walk the whole grocery store with some discomfort.  Patient noted no difficulty with standing for period of time. Patient is still taking a percocet prior to PT, other than that he is not taking pain medications. Patient Goals  Patient goals for therapy: increased strength, independence with ADLs/IADLs, return to sport/leisure activities, increased motion, improved balance, decreased pain and return to work  Patient goal: "to return to work." (RTW in September) "walk without pain." (in progress) "to hunt and fish without pain." (in progress)  Pain  Current pain ratin  At best pain ratin  At worst pain ratin  Location: R medial knee  Quality: dull ache  Relieving factors: medications  Aggravating factors: standing, walking, stair climbing, sitting and lifting  Progression: improved    Social Support  Steps to enter house: yes  1  Stairs in house: yes   14  Lives in: multiple-level home  Lives with: spouse and adult children (15 yo daughter)    Employment status: working (Maintenance, 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          Objective     Observations     Right Knee   Negative for drainage, edema and incision. Additional Observation Details  Patient presents with healed incision present on the R knee with no steri-strips/gauze.          Neurological Testing     Sensation     Knee   Left Knee   Intact: light touch    Right Knee   Intact: light touch     Active Range of Motion     Right Knee   Flexion: 125 degrees with pain  Extension: 0 degrees   Extensor la degrees     Passive Range of Motion     Right Knee   Flexion: 125 degrees with pain  Extension: 0 degrees     Mobility   Patellar Mobility:     Right Knee   WFL: medial and lateral  Hypomobile: superior and inferior     Strength/Myotome Testing     Left Hip   Planes of Motion   Flexion: 4+  Extension: 4+  Abduction: 4+  Adduction: 4+    Right Hip   Planes of Motion   Flexion: 4+  Extension: 4+  Abduction: 4  Adduction: 4+    Left Knee   Flexion: 5  Extension: 5    Right Knee   Flexion: 4  Extension: 4  Quadriceps contraction: good    Left Ankle/Foot   Dorsiflexion: 4-  Plantar flexion: 4-    Right Ankle/Foot   Dorsiflexion: 4  Plantar flexion: 4    Additional Strength Details  Patient able to perform x10 SLR with extensor lag    Patient amb with RW            Precautions: s/p R partial TKA on 6/20/23  R knee AROM as of 7/10/23: 0-125 degrees  R knee PROM as of 7/10/23: 0-125 degrees    Manuals 6/20 6/23 6/26 6/29 7/3 7/6 7/10      Patellar mobs nv   MW  Grade  III MW  Grade  III MW  grade  III MW  grade  III      R knee PROM nv MW MW MW MW MW MW      RE       MW      Quad rolling      MW edu       Scar massage       Only superior healed portion  MW      Neuro Re-Ed             HEP edu MW MW MW MW MW MW MW      SLR x10 x10 ea x15 ea 2x10 ea         Quad set x10  5" hold x20  5" hold           clams nv            bridge nv                                      Ther Ex             X-ride nv 10' rocking Nu step  10'  Seat 10 Nu step  10' seat 9 Nu step  10' seat 8 Nu step  10' seat 8 Nu step  10' seat 8      Hamstring stretch nv    @ step  3x30" ea @ step  3x30" ea @ step  3x30" ea      Calf stretch 3x30" 3x30"   @ foam roll  3x30"  @ step  3x30" ea @ step  3x30" ea @ step  3x30" ea      Ankle pumps/ heel raises x10    HR  x20        Prone quad stetch     5x20" 5x20" 5x20"      Heel slides x20 x20  5" hold x20  5" hold x20  5" hold  10x10" hold 10x10" hold      Hip abd supine x10  sidelying  x10 ea          Hip add nv  x20  5" hold          Seated marching  x10 ea x20 ea x20 ea         Leg press     DL  65#  x20  SL  45#  x20 ea   DL  65#  x25  SL  45#  x20 ea  Plate 9 DL  11#  P37  SL  55#  x20 ea  Plate 9 Plate 8     LAQ with other foot assist/ seated knee flexion with other foot assistance  x10 x20 x20 x20  nv with weight      Ther Activity             Sit to stands nv   x10 2 HHA x10  2 HHA  nv      TRX squats      2x10 x20      Step ups nv  Fwd ad down  4" and 6"  x20 ea, cane edu Fwd, down, lateral  6"  x20 ea (B)   nv      Gait Training             amb with SPC nv RW  1 lap 1 lap SPC with cues   2 laps no AD                    Modalities             CP  10' 10'

## 2023-07-11 NOTE — PROGRESS NOTES
Daily Note     Today's date: 2023  Patient name: Ilana Garza  : 1975  MRN: [id number]  Referring provider: Dasha Pettit*  Dx:   Encounter Diagnosis     ICD-10-CM    1. Status post right partial knee replacement  Z96.651       2. Bilateral primary osteoarthritis of knee  M17.0       3. Primary osteoarthritis of left knee  M17.12       4. Primary osteoarthritis of right knee  M17.11           Start Time: 1740  Stop Time: 1830  Total time in clinic (min): 50 minutes    Subjective: pt reports stiffness>pain      Objective: See treatment diary below      Assessment:Pt tolerated today's treatment session well    Initiated KTC gravity stretch for R knee flex and standing knee flex and marching today during session to help A w/ knee flex mobility and patient education provided on HEP and technique . Pt completed exer with no adverse reactions  . Pt continues to benefit from skilled PT services. Will continue to encourage HEP and PT attendance while addressing pt's  functional deficits & focusing on progression of POC as patient tolerates. Patient performed Nustep aerobic exercise to increase blood flow to the area being treated, prepare the muscles for strength training and stretching, improve overall tolerance to activity, and aerobic endurance. Plan: Continue per plan of care.       Precautions: s/p R partial TKA on 23  R knee AROM as of 7/10/23: 0-125 degrees  R knee PROM as of 7/10/23: 0-125 degrees    Manuals 6/20 6/23 6/26 6/29 7/3 7/6 7/10 7/13     Patellar mobs nv   MW  Grade  III MW  Grade  III MW  grade  III MW  grade  III AE grade III     R knee PROM nv MW MW MW MW MW MW AE     RE       MW      Quad rolling      MW edu       Scar massage       Only superior healed portion  MW      Neuro Re-Ed             HEP edu MW MW MW MW MW MW MW      SLR x10 x10 ea x15 ea 2x10 ea    2x10     Quad set x10  5" hold x20  5" hold      x10     clams nv            bridge nv Ther Ex             X-ride nv 10' rocking Nu step  10'  Seat 10 Nu step  10' seat 9 Nu step  10' seat 8 Nu step  10' seat 8 Nu step  10' seat 8 Nu step  10' seat 8     Hamstring stretch nv    @ step  3x30" ea @ step  3x30" ea @ step  3x30" ea @ step  3x30" ea     Calf stretch 3x30" 3x30"   @ foam roll  3x30"  @ step  3x30" ea @ step  3x30" ea @ step  3x30" ea @ step  3x30" ea     Knee flex stretch on step - - - - - - - x5 review     Stand:  -knee flex (butt kicks)  -march - - - - - - - x20 ea     Ankle pumps/ heel raises x10    HR  x20   home     Prone quad stetch     5x20" 5x20" 5x20" 3x20"and active knee flex     Heel slides x20 x20  5" hold x20  5" hold x20  5" hold  10x10" hold 10x10" hold KTC gravity stretch x 5 w/ hold     Hip abd supine x10  sidelying  x10 ea     S/l x 20     Hip add nv  x20  5" hold     home     Seated marching  x10 ea x20 ea x20 ea    home     Leg press     DL  65#  x20  SL  45#  x20 ea   DL  65#  x25  SL  45#  x20 ea  Plate 9 DL  57#  F48  SL  55#  x20 ea  Plate 9 Plate 8 DL 16# x 25     LAQ with other foot assist/ seated knee flexion with other foot assistance  x10 x20 x20 x20  nv with weight      Ther Activity             Sit to stands nv   x10 2 HHA x10  2 HHA  nv x10 1 and no HHA     TRX squats      2x10 x20      Step ups nv  Fwd ad down  4" and 6"  x20 ea, cane edu Fwd, down, lateral  6"  x20 ea (B)   nv 6" forward on RLE (reciprocal pattern L)2x10     Gait Training             amb with SPC nv RW  1 lap 1 lap SPC with cues   2 laps no AD                    Modalities             CP  10' 10'

## 2023-07-13 ENCOUNTER — OFFICE VISIT (OUTPATIENT)
Age: 48
End: 2023-07-13
Payer: COMMERCIAL

## 2023-07-13 DIAGNOSIS — M17.11 PRIMARY OSTEOARTHRITIS OF RIGHT KNEE: ICD-10-CM

## 2023-07-13 DIAGNOSIS — M17.12 PRIMARY OSTEOARTHRITIS OF LEFT KNEE: ICD-10-CM

## 2023-07-13 DIAGNOSIS — Z96.651 STATUS POST RIGHT PARTIAL KNEE REPLACEMENT: Primary | ICD-10-CM

## 2023-07-13 DIAGNOSIS — M17.0 BILATERAL PRIMARY OSTEOARTHRITIS OF KNEE: ICD-10-CM

## 2023-07-13 PROCEDURE — 97110 THERAPEUTIC EXERCISES: CPT

## 2023-07-15 NOTE — PROGRESS NOTES
Daily Note     Today's date: 2023  Patient name: Silver Ramos  : 1975  MRN: [id number]  Referring provider: Parminder Castro*  Dx:   Encounter Diagnosis     ICD-10-CM    1. Status post right partial knee replacement  Z96.651       2. Bilateral primary osteoarthritis of knee  M17.0       3. Primary osteoarthritis of left knee  M17.12       4. Primary osteoarthritis of right knee  M17.11           Start Time: 1120  Stop Time: 1215  Total time in clinic (min): 55 minutes    Subjective: Pt reports just having the normal pain (3/10). Objective: See treatment diary below      Assessment: Silver Ramos tolerated today's treatment session well. Patient education provided on progression of POC and TE.  Amos Fonseca completed all TE with good form and no adverse reactions. Pt has good functional ROM of LE. Still with quad weakness and extensor lag with SLR. He does have pain at scar with flexion where he has increased scar tissue. Encouraged him to completed scar massage at home. Amos Fonseca continues to benefit from skilled OPPT services to address post op ROM and strength deficits. Will continue to address functional deficits and focus on progression of POC per patient tolerance. Patient performed Nustep to increase blood flow to the area being treated, prepare the muscles for strength training and stretching, improve overall tolerance to activity, and aerobic endurance. Plan: Continue per plan of care. Progress treatment as tolerated.        Precautions: s/p R partial TKA on 23  R knee AROM as of 7/10/23: 0-125 degrees  R knee PROM as of 7/10/23: 0-125 degrees    Manuals 6/20 6/23 6/26 6/29 7/3 7/6 7/10 7/13 2023    Patellar mobs nv   MW  Grade  III MW  Grade  III MW  grade  III MW  grade  III AE grade III     R knee PROM nv MW MW MW MW MW MW AE KH flexion     RE       MW      Quad rolling      MW edu       Scar massage       Only superior healed portion  MW  IASTM more proximal scar and cupping     Neuro Re-Ed             HEP edu MW MW MW MW MW MW MW      SLR x10 x10 ea x15 ea 2x10 ea    2x10 2x10 (cues to maintain QS t/o SLR- some extensor lag)    Quad set x10  5" hold x20  5" hold      x10 10x10"    clams nv            bridge nv                                      Ther Ex             X-ride nv 10' rocking Nu step  10'  Seat 10 Nu step  10' seat 9 Nu step  10' seat 8 Nu step  10' seat 8 Nu step  10' seat 8 Nu step  10' seat 8 Nu step  10' seat 8    Hamstring stretch nv    @ step  3x30" ea @ step  3x30" ea @ step  3x30" ea @ step  3x30" ea At step 3x30" each     Calf stretch 3x30" 3x30"   @ foam roll  3x30"  @ step  3x30" ea @ step  3x30" ea @ step  3x30" ea @ step  3x30" ea At step 3x30"     Knee flex stretch on step - - - - - - - x5 review 10x10"    Stand:  -knee flex (butt kicks)  -march - - - - - - - x20 ea x20 each     Ankle pumps/ heel raises x10    HR  x20   home     Prone quad stetch     5x20" 5x20" 5x20" 3x20"and active knee flex 20" hold x5    Heel slides x20 x20  5" hold x20  5" hold x20  5" hold  10x10" hold 10x10" hold KTC gravity stretch x 5 w/ hold     Hip abd supine x10  sidelying  x10 ea     S/l x 20     Hip add nv  x20  5" hold     home     Seated marching  x10 ea x20 ea x20 ea    home     Leg press     DL  65#  x20  SL  45#  x20 ea   DL  65#  x25  SL  45#  x20 ea  Plate 9 DL  83#  O62  SL  55#  x20 ea  Plate 9 Plate 8 DL 50# x 25 Plate 8  DL 24# P72     SL 55# x20 BLE    LAQ with other foot assist/ seated knee flexion with other foot assistance  x10 x20 x20 x20  nv with weight      Ther Activity             Sit to stands nv   x10 2 HHA x10  2 HHA  nv x10 1 and no HHA With airex on chair x15 no UEs      TRX squats      2x10 x20      Step ups nv  Fwd ad down  4" and 6"  x20 ea, cane edu Fwd, down, lateral  6"  x20 ea (B)   nv 6" forward on RLE (reciprocal pattern L)2x10 6" step forward RLE x10     6" step Lateral x10    Gait Training             amb with SPC nv RW  1 lap 1 lap SPC with cues   2 laps no AD                    Modalities             CP  10' 10'

## 2023-07-17 ENCOUNTER — OFFICE VISIT (OUTPATIENT)
Age: 48
End: 2023-07-17
Payer: COMMERCIAL

## 2023-07-17 DIAGNOSIS — M17.0 BILATERAL PRIMARY OSTEOARTHRITIS OF KNEE: ICD-10-CM

## 2023-07-17 DIAGNOSIS — M17.12 PRIMARY OSTEOARTHRITIS OF LEFT KNEE: ICD-10-CM

## 2023-07-17 DIAGNOSIS — Z96.651 STATUS POST RIGHT PARTIAL KNEE REPLACEMENT: Primary | ICD-10-CM

## 2023-07-17 DIAGNOSIS — M17.11 PRIMARY OSTEOARTHRITIS OF RIGHT KNEE: ICD-10-CM

## 2023-07-17 PROCEDURE — 97140 MANUAL THERAPY 1/> REGIONS: CPT

## 2023-07-17 PROCEDURE — 97110 THERAPEUTIC EXERCISES: CPT

## 2023-07-19 NOTE — PROGRESS NOTES
Daily Note     Today's date: 2023  Patient name: Maycol Moon  : 1975  MRN: [id number]  Referring provider: Goldie Crabtree*  Dx:   Encounter Diagnosis     ICD-10-CM    1. Status post right partial knee replacement  Z96.651       2. Primary osteoarthritis of right knee  M17.11       3. Bilateral primary osteoarthritis of knee  M17.0       4. Primary osteoarthritis of left knee  M17.12           Start Time: 1130  Stop Time: 1215  Total time in clinic (min): 45 minutes    Subjective: Pt denies pain in the knee. Pt reports tightness superior to the knee. Pt reports compliance with HEP and denies questions or concerns at this time. Objective: See treatment diary below      Assessment:Pt performed Nustep aerobic exercises to increase blood flow to the area being treated, prepare the muscles for strength training and stretching, improve overall tolerance to activity, and aerobic endurance. Pt had greatest difficulty with standing from the chair without UE support compared to eccentric lowering to the chair. Pt required minimal verbal cueing to prevent plopping onto the chair. Pt demonstrated mild difficulty when stepping up onto the step with the R LE with observable compensatory strategies from the hip and lateral trunk lean. Will continue to advance pt program as appropriate based on overall strength, ROM, and symptom irritability. Patient would benefit from continued PT in order to continue progressing towards goals as outlined and return to PLOF. Plan: Continue per plan of care.       Precautions: s/p R partial TKA on 23  R knee AROM as of 7/10/23: 0-125 degrees  R knee PROM as of 7/10/23: 0-125 degrees    Manuals 6/20 6/23 6/26 6/29 7/3 7/6 7/10 7/13 2023 7/20   Patellar mobs nv   MW  Grade  III MW  Grade  III MW  grade  III MW  grade  III AE grade III     R knee PROM nv MW MW MW MW MW MW AE KH flexion  RR Flexion in prone   RE       MW      Quad rolling      MW edu Scar massage       Only superior healed portion  MW  IASTM more proximal scar and cupping     Neuro Re-Ed             HEP edu MW MW MW MW MW MW MW      SLR x10 x10 ea x15 ea 2x10 ea    2x10 2x10 (cues to maintain QS t/o SLR- some extensor lag) 2x10   Quad set x10  5" hold x20  5" hold      x10 10x10" 10x10"   clams nv            bridge nv                                      Ther Ex             X-ride nv 10' rocking Nu step  10'  Seat 10 Nu step  10' seat 9 Nu step  10' seat 8 Nu step  10' seat 8 Nu step  10' seat 8 Nu step  10' seat 8 Nu step  10' seat 8 Nu step  10' seat 8   Hamstring stretch nv    @ step  3x30" ea @ step  3x30" ea @ step  3x30" ea @ step  3x30" ea At step 3x30" each  At step 3x30" each    Calf stretch 3x30" 3x30"   @ foam roll  3x30"  @ step  3x30" ea @ step  3x30" ea @ step  3x30" ea @ step  3x30" ea At step 3x30"  At step 1/2 foam roll x1 min   Knee flex stretch on step - - - - - - - x5 review 10x10" 5x30"   Stand:  -knee flex (butt kicks)  -march - - - - - - - x20 ea x20 each  x20 each    Ankle pumps/ heel raises x10    HR  x20   home     Prone quad stetch     5x20" 5x20" 5x20" 3x20"and active knee flex 20" hold x5 20" hold x5   Heel slides x20 x20  5" hold x20  5" hold x20  5" hold  10x10" hold 10x10" hold KTC gravity stretch x 5 w/ hold     Hip abd supine x10  sidelying  x10 ea     S/l x 20     Hip add nv  x20  5" hold     home     Seated marching  x10 ea x20 ea x20 ea    home     Leg press     DL  65#  x20  SL  45#  x20 ea   DL  65#  x25  SL  45#  x20 ea  Plate 9 DL  66#  Q60  SL  55#  x20 ea  Plate 9 Plate 8 DL 84# x 25 Plate 8  DL 42# E62     SL 55# x20 BLE Plate 8  DL 38# H23, R 55# x20, L 65# x20   LAQ with other foot assist/ seated knee flexion with other foot assistance  x10 x20 x20 x20  nv with weight      Ther Activity             Sit to stands nv   x10 2 HHA x10  2 HHA  nv x10 1 and no HHA With airex on chair x15 no UEs   With airex on chair x15 no UEs     TRX squats      2x10 x20      Step ups nv  Fwd ad down  4" and 6"  x20 ea, cane edu Fwd, down, lateral  6"  x20 ea (B)   nv 6" forward on RLE (reciprocal pattern L)2x10 6" step forward RLE x10     6" step Lateral x10 6" step forward RLE x10     6" step Lateral x10   Gait Training             amb with SPC nv RW  1 lap 1 lap SPC with cues   2 laps no AD                    Modalities             CP  10' 10'

## 2023-07-20 ENCOUNTER — OFFICE VISIT (OUTPATIENT)
Age: 48
End: 2023-07-20
Payer: COMMERCIAL

## 2023-07-20 DIAGNOSIS — M17.12 PRIMARY OSTEOARTHRITIS OF LEFT KNEE: ICD-10-CM

## 2023-07-20 DIAGNOSIS — M17.0 BILATERAL PRIMARY OSTEOARTHRITIS OF KNEE: ICD-10-CM

## 2023-07-20 DIAGNOSIS — M17.11 PRIMARY OSTEOARTHRITIS OF RIGHT KNEE: ICD-10-CM

## 2023-07-20 DIAGNOSIS — Z96.651 STATUS POST RIGHT PARTIAL KNEE REPLACEMENT: Primary | ICD-10-CM

## 2023-07-20 PROCEDURE — 97110 THERAPEUTIC EXERCISES: CPT

## 2023-07-20 PROCEDURE — 97530 THERAPEUTIC ACTIVITIES: CPT

## 2023-07-25 ENCOUNTER — OFFICE VISIT (OUTPATIENT)
Age: 48
End: 2023-07-25
Payer: COMMERCIAL

## 2023-07-25 DIAGNOSIS — M17.0 BILATERAL PRIMARY OSTEOARTHRITIS OF KNEE: ICD-10-CM

## 2023-07-25 DIAGNOSIS — Z96.651 STATUS POST RIGHT PARTIAL KNEE REPLACEMENT: Primary | ICD-10-CM

## 2023-07-25 DIAGNOSIS — M17.12 PRIMARY OSTEOARTHRITIS OF LEFT KNEE: ICD-10-CM

## 2023-07-25 DIAGNOSIS — M17.11 PRIMARY OSTEOARTHRITIS OF RIGHT KNEE: ICD-10-CM

## 2023-07-25 PROCEDURE — 97110 THERAPEUTIC EXERCISES: CPT

## 2023-07-25 PROCEDURE — 97530 THERAPEUTIC ACTIVITIES: CPT

## 2023-07-25 PROCEDURE — 97112 NEUROMUSCULAR REEDUCATION: CPT

## 2023-07-25 NOTE — PROGRESS NOTES
Daily Note     Today's date: 2023  Patient name: Silver Ramos  : 1975  MRN: [id number]  Referring provider: Parminder Castro*  Dx:   Encounter Diagnosis     ICD-10-CM    1. Status post right partial knee replacement  Z96.651       2. Primary osteoarthritis of right knee  M17.11       3. Bilateral primary osteoarthritis of knee  M17.0       4. Primary osteoarthritis of left knee  M17.12           Start Time: 1130  Stop Time: 1215  Total time in clinic (min): 45 minutes    Subjective: Patient noted the knee feels pretty good. Patient noted that he drove to [place] and noted some stiffness. Patient noted that the stairs are getting better. Objective: See treatment diary below      Assessment: Patient performed Recumbent bike aerobic exercise to increase blood flow to the area being treated, prepare the muscles for strength training and stretching, improve overall tolerance to activity, and aerobic endurance. PT performed scar massage to assist c pain and ROM. Patient continues to improve with strength 2* less compensations with sit to stands and stair negotiation. Patient continues to perform a quad set/ SLR with some extensor lag. PT educated the patient on his HEP. Patient would benefit from continued PT to allow the patient to return to his PLOF. Plan: Continue per plan of care.       Precautions: s/p R partial TKA on 23  R knee AROM as of 7/10/23: 0-125 degrees  R knee PROM as of 7/10/23: 0-125 degrees    Manuals 7/25   6/29 7/3 7/6 7/10 7/13 2023 7/20   Patellar mobs MW  Grade III   MW  Grade  III MW  Grade  III MW  grade  III MW  grade  III AE grade III     R knee PROM    MW MW MW MW AE KH flexion  RR Flexion in prone   RE       MW      Quad rolling      MW edu       Scar massage MW      Only superior healed portion  MW  IASTM more proximal scar and cupping     Neuro Re-Ed             HEP edu MW   MW MW MW MW      SLR    2x10 ea    2x10 2x10 (cues to maintain QS t/o SLR- some extensor lag) 2x10   Quad set 10x10" hold       x10 10x10" 10x10"   clams             bridge             SLR x15                         Ther Ex             X-ride Bike  10'   Nu step  10' seat 9 Nu step  10' seat 8 Nu step  10' seat 8 Nu step  10' seat 8 Nu step  10' seat 8 Nu step  10' seat 8 Nu step  10' seat 8   Hamstring stretch @ step 3x30" ea    @ step  3x30" ea @ step  3x30" ea @ step  3x30" ea @ step  3x30" ea At step 3x30" each  At step 3x30" each    Calf stretch @ step 3x30" ea   @ foam roll  3x30"  @ step  3x30" ea @ step  3x30" ea @ step  3x30" ea @ step  3x30" ea At step 3x30"  At step 1/2 foam roll x1 min   Knee flex stretch on step 10x10"   - - - - x5 review 10x10" 5x30"   Stand:  -knee flex (butt kicks)  -march    - - - - x20 ea x20 each  x20 each    Ankle pumps/ heel raises     HR  x20   home     Prone quad stetch     5x20" 5x20" 5x20" 3x20"and active knee flex 20" hold x5 20" hold x5   Heel slides    x20  5" hold  10x10" hold 10x10" hold KTC gravity stretch x 5 w/ hold     Hip abd supine        S/l x 20     Hip add        home     Seated marching    x20 ea    home     Leg press     DL  65#  x20  SL  45#  x20 ea   DL  65#  x25  SL  45#  x20 ea  Plate 9 DL  42#  S91  SL  55#  x20 ea  Plate 9 Plate 8 DL 69# x 25 Plate 8  DL 85# Q07     SL 55# x20 BLE Plate 8  DL 63# E83, R 55# x20, L 65# x20   LAQ with other foot assist/ seated knee flexion with other foot assistance x30   x20 x20  nv with weight      Ther Activity             Sit to stands No airex pad  x20   x10 2 HHA x10  2 HHA  nv x10 1 and no HHA With airex on chair x15 no UEs   With airex on chair x15 no UEs     TRX squats      2x10 x20      Step ups 6"  forward  x15 ea (B)  Down  x15 ea (B)   Fwd, down, lateral  6"  x20 ea (B)   nv 6" forward on RLE (reciprocal pattern L)2x10 6" step forward RLE x10     6" step Lateral x10 6" step forward RLE x10     6" step Lateral x10   Gait Training             amb with SPC      2 laps no AD Modalities             CP

## 2023-07-27 ENCOUNTER — OFFICE VISIT (OUTPATIENT)
Age: 48
End: 2023-07-27
Payer: COMMERCIAL

## 2023-07-27 DIAGNOSIS — M17.12 PRIMARY OSTEOARTHRITIS OF LEFT KNEE: ICD-10-CM

## 2023-07-27 DIAGNOSIS — M17.0 BILATERAL PRIMARY OSTEOARTHRITIS OF KNEE: ICD-10-CM

## 2023-07-27 DIAGNOSIS — Z96.651 STATUS POST RIGHT PARTIAL KNEE REPLACEMENT: Primary | ICD-10-CM

## 2023-07-27 DIAGNOSIS — M17.11 PRIMARY OSTEOARTHRITIS OF RIGHT KNEE: ICD-10-CM

## 2023-07-27 PROCEDURE — 97530 THERAPEUTIC ACTIVITIES: CPT

## 2023-07-27 PROCEDURE — 97112 NEUROMUSCULAR REEDUCATION: CPT

## 2023-07-27 PROCEDURE — 97110 THERAPEUTIC EXERCISES: CPT

## 2023-07-27 NOTE — PROGRESS NOTES
Daily Note     Today's date: 2023  Patient name: Kaley Griffin  : 1975  MRN: [id number]  Referring provider: Brian Pathak*  Dx:   Encounter Diagnosis     ICD-10-CM    1. Status post right partial knee replacement  Z96.651       2. Primary osteoarthritis of right knee  M17.11       3. Bilateral primary osteoarthritis of knee  M17.0       4. Primary osteoarthritis of left knee  M17.12           Start Time: 1030  Stop Time: 1115  Total time in clinic (min): 45 minutes    Subjective: Patient noted that he did not take pain medications prior to the session. Patient noted that he is very stiff this morning. Objective: See treatment diary below      Assessment: Patient performed Recumbent bike aerobic exercise to increase blood flow to the area being treated, prepare the muscles for strength training and stretching, improve overall tolerance to activity, and aerobic endurance. Patient continues to improve with strength 2* improved form with sit to stands as well as added weight with LAQ/SAQ with minimal extensor lag. Patient continues to require 2 HHA for descending stairs, however only requires 1 HHA for ascending the steps. Patient performed sit to stands to 18" plyo box with mild deviations. PT educated the patient on his HEP due to going on vacation for a week. Patient would benefit from continued PT to allow the patient to return to his PLOF. Plan: Continue per plan of care. Patient will follow up after his vacation.       Precautions: s/p R partial TKA on 23  R knee AROM as of 7/10/23: 0-125 degrees  R knee PROM as of 7/10/23: 0-125 degrees    Manuals 7/25 7/27     7/10 7/13 2023 7/20   Patellar mobs MW  Grade III      MW  grade  III AE grade III     R knee PROM       MW AE KH flexion  RR Flexion in prone   RE       MW      Quad rolling             Scar massage MW      Only superior healed portion  MW  IASTM more proximal scar and cupping     Neuro Re-Ed HEP edu MW MW     MW      SLR  3#  x20 ea      2x10 2x10 (cues to maintain QS t/o SLR- some extensor lag) 2x10   Quad set 10x10" hold       x10 10x10" 10x10"   clams             bridge             SLR x15                         Ther Ex             X-ride Bike  10' Bike  10'     Nu step  10' seat 8 Nu step  10' seat 8 Nu step  10' seat 8 Nu step  10' seat 8   Hamstring stretch @ step 3x30" ea 3x30" ea     @ step  3x30" ea @ step  3x30" ea At step 3x30" each  At step 3x30" each    Calf stretch @ step 3x30" ea 3x30" ea     @ step  3x30" ea @ step  3x30" ea At step 3x30"  At step 1/2 foam roll x1 min   Knee flex stretch on step 10x10" 10x10"     - x5 review 10x10" 5x30"   Stand:  -knee flex (butt kicks)  -march       - x20 ea x20 each  x20 each    Ankle pumps/ heel raises        home     Prone quad stetch       5x20" 3x20"and active knee flex 20" hold x5 20" hold x5   Heel slides       10x10" hold KTC gravity stretch x 5 w/ hold     Hip abd supine        S/l x 20     Hip add        home     Seated marching        home     Leg press       DL  75#  x25  SL  55#  x20 ea  Plate 9 Plate 8 DL 03# x 25 Plate 8  DL 79# C17     SL 55# x20 BLE Plate 8  DL 41# B02, R 55# x20, L 65# x20   SAQ  x20  3#  5" hold           LAQ with other foot assist/ seated knee flexion with other foot assistance x30 x20  3#     nv with weight      Ther Activity             Sit to stands No airex pad  x20 24" x15 & 18" x10 no hands       nv x10 1 and no HHA With airex on chair x15 no UEs   With airex on chair x15 no UEs     TRX squats       x20      Step ups 6"  forward  x15 ea (B)  Down  x15 ea (B) Down  6"  x15 ea (B)    Up   8"  x20 ea (B)     nv 6" forward on RLE (reciprocal pattern L)2x10 6" step forward RLE x10     6" step Lateral x10 6" step forward RLE x10     6" step Lateral x10   Gait Training             amb with SPC                          Modalities             CP

## 2023-08-10 ENCOUNTER — OFFICE VISIT (OUTPATIENT)
Age: 48
End: 2023-08-10
Payer: COMMERCIAL

## 2023-08-10 DIAGNOSIS — Z96.651 STATUS POST RIGHT PARTIAL KNEE REPLACEMENT: Primary | ICD-10-CM

## 2023-08-10 DIAGNOSIS — M17.12 PRIMARY OSTEOARTHRITIS OF LEFT KNEE: ICD-10-CM

## 2023-08-10 DIAGNOSIS — M17.11 PRIMARY OSTEOARTHRITIS OF RIGHT KNEE: ICD-10-CM

## 2023-08-10 DIAGNOSIS — M17.0 BILATERAL PRIMARY OSTEOARTHRITIS OF KNEE: ICD-10-CM

## 2023-08-10 PROCEDURE — 97530 THERAPEUTIC ACTIVITIES: CPT

## 2023-08-10 PROCEDURE — 97112 NEUROMUSCULAR REEDUCATION: CPT

## 2023-08-10 PROCEDURE — 97110 THERAPEUTIC EXERCISES: CPT

## 2023-08-10 NOTE — PROGRESS NOTES
Daily Note     Today's date: 8/10/2023  Patient name: Pardeep Yeh  : 1975  MRN: [id number]  Referring provider: Elif Anderson*  Dx:   Encounter Diagnosis     ICD-10-CM    1. Status post right partial knee replacement  Z96.651       2. Primary osteoarthritis of right knee  M17.11       3. Bilateral primary osteoarthritis of knee  M17.0       4. Primary osteoarthritis of left knee  M17.12           Start Time: 1400  Stop Time: 1445  Total time in clinic (min): 45 minutes    Subjective: Patient met with his physician and he noted good progress. Patient is going back to work on 23. Objective: See treatment diary below      Assessment: Patient performed Recumbent bike aerobic exercise to increase blood flow to the area being treated, prepare the muscles for strength training and stretching, improve overall tolerance to activity, and aerobic endurance. PT introduced lateral stepping and floor transfers with minimal difficulty. Patient required HHA for floor to stand transfer and noted some tenderness with kneeling on the R knee. Patient performed 3 way hip and lateral step ups with min VCs for form. PT performed IASTM to distal quad to assist c pain and ROM. Patient noted some tenderness post session. Overall the patient is progressing 2* improved form and increased activity tolerance. PT educated the patient on his HEP. Patient would benefit from continued PT to allow the patient to return to his PLOF. Plan: Continue per plan of care.       Precautions: s/p R partial TKA on 23  R knee AROM as of 7/10/23: 0-125 degrees  R knee PROM as of 7/10/23: 0-125 degrees    Manuals 7/25 7/27 8/10    7/10 7/13 2023 7/20   Patellar mobs MW  Grade III      MW  grade  III AE grade III     R knee PROM       MW AE KH flexion  RR Flexion in prone   RE       MW      Quad rolling             Scar massage MW  IASTM  Distal quad  MW    Only superior healed portion  MW  IASTM more proximal scar and cupping     Neuro Re-Ed             HEP edu MW MW MW    MW      SLR  3#  x20 ea      2x10 2x10 (cues to maintain QS t/o SLR- some extensor lag) 2x10   Quad set 10x10" hold       x10 10x10" 10x10"   clams             bridge             SLR x15            3 way hip on foam   x15 ea (B)          Ther Ex             X-ride Bike  10' Bike  10' Bike  10'  lvl 2    Nu step  10' seat 8 Nu step  10' seat 8 Nu step  10' seat 8 Nu step  10' seat 8   Hamstring stretch @ step 3x30" ea 3x30" ea Strap  3x30" ea    @ step  3x30" ea @ step  3x30" ea At step 3x30" each  At step 3x30" each    Calf stretch @ step 3x30" ea 3x30" ea     @ step  3x30" ea @ step  3x30" ea At step 3x30"  At step 1/2 foam roll x1 min   Knee flex stretch on step 10x10" 10x10"     - x5 review 10x10" 5x30"   Stand:  -knee flex (butt kicks)  -march       - x20 ea x20 each  x20 each    Ankle pumps/ heel raises        home     Prone quad stetch       5x20" 3x20"and active knee flex 20" hold x5 20" hold x5   Heel slides       10x10" hold KTC gravity stretch x 5 w/ hold     Hip abd supine        S/l x 20     Hip add        home     Seated marching        home     Leg press   Plate 8  DL 36# L66, R 65# x20, L 65# x20    DL  75#  x25  SL  55#  x20 ea  Plate 9 Plate 8 DL 18# x 25 Plate 8  DL 94# W61     SL 55# x20 BLE Plate 8  DL 73# O18, R 55# x20, L 65# x20   SAQ  x20  3#  5" hold           LAQ with other foot assist/ seated knee flexion with other foot assistance x30 x20  3#     nv with weight      Ther Activity             Sit to stands No airex pad  x20 24" x15 & 18" x10 no hands   24" x15    18"  x20 no hands    nv x10 1 and no HHA With airex on chair x15 no UEs   With airex on chair x15 no UEs     Floor to stand transfers   x5 with hands each  x2 each no hands          TRX squats       x20      Step ups 6"  forward  x15 ea (B)  Down  x15 ea (B) Down  6"  x15 ea (B)    Up   8"  x20 ea (B) 12" forward and lateral  x10 ea (B)    nv 6" forward on RLE (reciprocal pattern L)2x10 6" step forward RLE x10     6" step Lateral x10 6" step forward RLE x10     6" step Lateral x10   Gait Training             amb with SPC                          Modalities             CP

## 2023-08-14 ENCOUNTER — OFFICE VISIT (OUTPATIENT)
Age: 48
End: 2023-08-14
Payer: COMMERCIAL

## 2023-08-14 DIAGNOSIS — Z96.651 STATUS POST RIGHT PARTIAL KNEE REPLACEMENT: Primary | ICD-10-CM

## 2023-08-14 DIAGNOSIS — M17.11 PRIMARY OSTEOARTHRITIS OF RIGHT KNEE: ICD-10-CM

## 2023-08-14 DIAGNOSIS — M17.12 PRIMARY OSTEOARTHRITIS OF LEFT KNEE: ICD-10-CM

## 2023-08-14 DIAGNOSIS — M17.0 BILATERAL PRIMARY OSTEOARTHRITIS OF KNEE: ICD-10-CM

## 2023-08-14 PROCEDURE — 97110 THERAPEUTIC EXERCISES: CPT

## 2023-08-14 PROCEDURE — 97112 NEUROMUSCULAR REEDUCATION: CPT

## 2023-08-14 PROCEDURE — 97530 THERAPEUTIC ACTIVITIES: CPT

## 2023-08-14 NOTE — PROGRESS NOTES
Daily Note     Today's date: 2023  Patient name: Chavez Quintero  : 1975  MRN: [id number]  Referring provider: Ciro Alcala*  Dx:   Encounter Diagnosis     ICD-10-CM    1. Status post right partial knee replacement  Z96.651       2. Primary osteoarthritis of right knee  M17.11       3. Bilateral primary osteoarthritis of knee  M17.0       4. Primary osteoarthritis of left knee  M17.12           Start Time: 1200  Stop Time: 1245  Total time in clinic (min): 45 minutes    Subjective: Patient noted that he was sore for about two days in the hips after last session. Objective: See treatment diary below      Assessment: Patient performed Recumbent bike aerobic exercise to increase blood flow to the area being treated, prepare the muscles for strength training and stretching, improve overall tolerance to activity, and aerobic endurance. Patient performed floor transfers and sit to stands with improved form and less cues required. Patient performed step ups without HHA, however required cues for knee and hip extension when fatigued. PT educated the patient on his HEP. Patient would benefit from continued PT to allow the patient to return to his PLOF. Plan: Continue per plan of care.       Precautions: s/p R partial TKA on 23  R knee AROM as of 7/10/23: 0-125 degrees  R knee PROM as of 7/10/23: 0-125 degrees    Manuals 7/25 7/27 8/10 8/14   7/10 7/13 2023 7/20   Patellar mobs MW  Grade III      MW  grade  III AE grade III     R knee PROM       MW AE KH flexion  RR Flexion in prone   RE       MW      Quad rolling             Scar massage MW  IASTM  Distal quad  MW    Only superior healed portion  MW  IASTM more proximal scar and cupping     Neuro Re-Ed             HEP edu MW MW MW MW   MW      SLR  3#  x20 ea      2x10 2x10 (cues to maintain QS t/o SLR- some extensor lag) 2x10   Quad set 10x10" hold       x10 10x10" 10x10"   clams             bridge             SLR x15 3 way hip on foam   x15 ea (B) x15 ea (B)         Ther Ex             X-ride Bike  10' Bike  10' Bike  10'  lvl 2 Bike  10'  lvl 2   Nu step  10' seat 8 Nu step  10' seat 8 Nu step  10' seat 8 Nu step  10' seat 8   Hamstring stretch @ step 3x30" ea 3x30" ea Strap  3x30" ea    @ step  3x30" ea @ step  3x30" ea At step 3x30" each  At step 3x30" each    Calf stretch @ step 3x30" ea 3x30" ea     @ step  3x30" ea @ step  3x30" ea At step 3x30"  At step 1/2 foam roll x1 min   Knee flex stretch on step 10x10" 10x10"     - x5 review 10x10" 5x30"   Stand:  -knee flex (butt kicks)  -march       - x20 ea x20 each  x20 each    Ankle pumps/ heel raises        home     Prone quad stetch       5x20" 3x20"and active knee flex 20" hold x5 20" hold x5   Heel slides       10x10" hold KTC gravity stretch x 5 w/ hold     Hip abd supine        S/l x 20     Hip add        home     Seated marching        home     Leg press   Plate 8  DL 29# E23, R 65# x20, L 65# x20 Plate 8  DL 06# I20, R 65# x20, L 65# x20   DL  75#  x25  SL  55#  x20 ea  Plate 9 Plate 8 DL 21# x 25 Plate 8  DL 31# W72     SL 55# x20 BLE Plate 8  DL 20# I73, R 55# x20, L 65# x20   SAQ  x20  3#  5" hold           LAQ with other foot assist/ seated knee flexion with other foot assistance x30 x20  3#     nv with weight      Ther Activity             Sit to stands No airex pad  x20 24" x15 & 18" x10 no hands   24" x15    18"  x20 no hands 18"  x20 no hands   nv x10 1 and no HHA With airex on chair x15 no UEs   With airex on chair x15 no UEs     Floor to stand transfers   x5 with hands each  x2 each no hands x5 ea with hands  x3 ea without hands         TRX squats       x20      Step ups 6"  forward  x15 ea (B)  Down  x15 ea (B) Down  6"  x15 ea (B)    Up   8"  x20 ea (B) 12" forward and lateral  x10 ea (B) 12" forward and lateral  x10 ea (B)   nv 6" forward on RLE (reciprocal pattern L)2x10 6" step forward RLE x10     6" step Lateral x10 6" step forward RLE x10     6" step Lateral x10   Gait Training             amb with SPC                          Modalities             CP

## 2023-08-23 ENCOUNTER — OFFICE VISIT (OUTPATIENT)
Age: 48
End: 2023-08-23
Payer: COMMERCIAL

## 2023-08-23 DIAGNOSIS — M17.11 PRIMARY OSTEOARTHRITIS OF RIGHT KNEE: ICD-10-CM

## 2023-08-23 DIAGNOSIS — M17.0 BILATERAL PRIMARY OSTEOARTHRITIS OF KNEE: ICD-10-CM

## 2023-08-23 DIAGNOSIS — M17.12 PRIMARY OSTEOARTHRITIS OF LEFT KNEE: ICD-10-CM

## 2023-08-23 DIAGNOSIS — Z96.651 STATUS POST RIGHT PARTIAL KNEE REPLACEMENT: Primary | ICD-10-CM

## 2023-08-23 PROCEDURE — 97164 PT RE-EVAL EST PLAN CARE: CPT

## 2023-08-23 PROCEDURE — 97530 THERAPEUTIC ACTIVITIES: CPT

## 2023-08-23 PROCEDURE — 97110 THERAPEUTIC EXERCISES: CPT

## 2023-08-23 NOTE — LETTER
2023    Teofilo Ghosh MD  [address] [place] [address]    Patient: Lashae Padron   YOB: 1975   Date of Visit: 2023     Encounter Diagnosis     ICD-10-CM    1. Status post right partial knee replacement  Z96.651       2. Primary osteoarthritis of right knee  M17.11       3. Bilateral primary osteoarthritis of knee  M17.0       4. Primary osteoarthritis of left knee  M17.12           Dear Dr. Idalia Ty:    Thank you for your recent referral of Lashae Padron. Please review the attached evaluation summary from Talha's recent visit. Please verify that you agree with the plan of care by signing the attached order. If you have any questions or concerns, please do not hesitate to call. I sincerely appreciate the opportunity to share in the care of one of your patients and hope to have another opportunity to work with you in the near future. Sincerely,    Marzetta Cranker, PT      Referring Provider:      I certify that I have read the below Plan of Care and certify the need for these services furnished under this plan of treatment while under my care. Teofilo Ghosh MD  [id number] [address]  Via Fax: [phone]          PT Discharge    Today's date: 2023  Patient name: Lashae Padron  : 1975  MRN: [id number]  Referring provider: Teofilo Ghosh*  Dx:   Encounter Diagnosis     ICD-10-CM    1. Status post right partial knee replacement  Z96.651       2. Primary osteoarthritis of right knee  M17.11       3. Bilateral primary osteoarthritis of knee  M17.0       4. Primary osteoarthritis of left knee  M17.12           Start Time: 915  Stop Time: 1000  Total time in clinic (min): 45 minutes    Assessment  Assessment details: Patient is a 47 yo male presenting to physical therapy s/p R partial knee replacement on 23.  Since last re-evaluation the patient has improved with strength, ROM, endurance and activity tolerance. Patient is able to perform all ADLs and notes improvements with steps, negotiating hills and walking. Patient is able to kneel at times and perform floor transfers. Patient feels (I) with his HEP. Due to noted improvements, the patient will be D/C from PT with a HEP. PT and patient agree with POC. Understanding of Dx/Px/POC: good   Prognosis: good    Goals  Post-Operative Goals:  STG: In four weeks the patient will:    1. Be (I) with his HEP. (MET)  2. Increase hip and knee strength to 4+/5 MMT score to assist c ADLs.(MET)  3. Increase R knee ROM by 15 degrees to assist c walking and transfers. (MET)  4. amb with a RW for 200ft with mod (I) with proper gait mechanics. (MET)  5. amb with a SPC for 200ft with mod (I) with proper gait mechanics. (MET)  6. Negotiate 14 steps with proper mechanics with a SPC to assist c navigating his second floor of his home (MET)      LTG: In eight weeks, the patient will:    1. Increase FOTO score to 68 to demonstrate improvements in symptoms and function. (MET)  2. Demonstrate full R knee AROM without pain.(MET)  3. Negotiate a full set of stairs at his home without pain.(MET)  4. Increase hip and knee strength to 5/5 MMT score to assist c prolonged walking and stairs. (MET)  5. amb (I) for 200ft with proper gait mechanics.(MET)  6. Return to work. (RTW on 9/11/23)      Plan  Therapy options: D/C from PT due to improvements. Duration in visits: 1  Plan of Care beginning date: 8/23/2023  Plan of Care expiration date: 8/23/2023  Treatment plan discussed with: patient        Subjective Evaluation    History of Present Illness  Mechanism of injury: Patient is s/p R medial partial knee replacement on 6/19/23. Patient noted he is 80% back to his PLOF. Patient noted that he is able to perform all ADLs without difficulty. Patient is able to climb ladders and walk for 2-3 miles without difficulty.  Patient noted that he is able to kneel, however it has a different sensation than prior to his surgery. Patient noted ascending the steps is not difficult, however descending the steps in the morning feels stiff at times. Patient is able to amb in the community without difficulty. Patient noted that he is returning work 23. Patient feels (I) with his HEP. Patient is off all of his pain medications. Patient Goals  Patient goal: "to return to work." (RTW in September) "walk without pain." (MET)"to hunt and fish without pain." (MET)  Pain  Current pain ratin  At best pain ratin  At worst pain ratin  Location: R medial knee  Quality: stiffness. Relieving factors: medications  Aggravating factors: standing, walking, stair climbing, sitting and lifting (improved)  Progression: improved    Social Support  Steps to enter house: yes  1  Stairs in house: yes   14  Lives in: multiple-level home  Lives with: spouse and adult children (15 yo daughter)    Employment status: working (Maintenance, 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          Objective     Observations     Right Knee   Negative for drainage, edema and incision. Additional Observation Details  Patient presents with healed incision present on the R knee with no steri-strips/gauze.          Neurological Testing     Sensation     Knee   Left Knee   Intact: light touch    Right Knee   Intact: light touch     Active Range of Motion     Right Knee   Flexion: 125 degrees   Extension: 0 degrees   Extensor la degrees     Passive Range of Motion     Right Knee   Flexion: 125 degrees   Extension: 0 degrees     Mobility   Patellar Mobility:     Right Knee   WFL: medial, lateral, superior and inferior    Strength/Myotome Testing     Left Hip   Planes of Motion   Flexion: 5  Extension: 5  Abduction: 5  Adduction: 5    Right Hip   Planes of Motion   Flexion: 5  Extension: 5  Abduction: 5  Adduction: 5    Left Knee   Flexion: 5  Extension: 5    Right Knee   Flexion: 5  Extension: 5  Quadriceps contraction: good    Left Ankle/Foot   Dorsiflexion: 5  Plantar flexion: 5    Right Ankle/Foot   Dorsiflexion: 5  Plantar flexion: 5    Additional Strength Details  Patient able to perform x10 SLR with extensor lag                Precautions: s/p R partial TKA on 6/20/23  R knee AROM as of 7/10/23: 0-125 degrees  R knee PROM as of 7/10/23: 0-125 degrees    Manuals 7/25 7/27 8/10 8/14 8/23  7/10 7/13 7/17/2023 7/20   Patellar mobs MW  Grade III      MW  grade  III AE grade III     R knee PROM       MW AE KH flexion  RR Flexion in prone   RE     MW  MW      Quad rolling             Scar massage MW  IASTM  Distal quad  MW    Only superior healed portion  MW  IASTM more proximal scar and cupping     Neuro Re-Ed             HEP edu MW MW MW MW MW  MW      SLR  3#  x20 ea      2x10 2x10 (cues to maintain QS t/o SLR- some extensor lag) 2x10   Quad set 10x10" hold       x10 10x10" 10x10"   clams             bridge             SLR x15            3 way hip on foam   x15 ea (B) x15 ea (B)         Ther Ex             X-ride Bike  10' Bike  10' Bike  10'  lvl 2 Bike  10'  lvl 2 Bike  10' lvl 2  Nu step  10' seat 8 Nu step  10' seat 8 Nu step  10' seat 8 Nu step  10' seat 8   Hamstring stretch @ step 3x30" ea 3x30" ea Strap  3x30" ea    @ step  3x30" ea @ step  3x30" ea At step 3x30" each  At step 3x30" each    Calf stretch @ step 3x30" ea 3x30" ea   3x30" ea  @ step  3x30" ea @ step  3x30" ea At step 3x30"  At step 1/2 foam roll x1 min   Knee flex stretch on step 10x10" 10x10"   10x10"  - x5 review 10x10" 5x30"   Stand:  -knee flex (butt kicks)  -march       - x20 ea x20 each  x20 each    Ankle pumps/ heel raises        home     Prone quad stetch       5x20" 3x20"and active knee flex 20" hold x5 20" hold x5   Heel slides       10x10" hold KTC gravity stretch x 5 w/ hold     Hip abd supine        S/l x 20     Hip add        home     Seated marching        home     Leg press   Plate 8  DL 20# M21, R 65# x20, L 65# x20 Plate 8  DL 60# D43, R 65# x20, L 65# x20 Plate 7  DL 71# E56, R 65# x30, L 65# x30  DL  75#  x25  SL  55#  x20 ea  Plate 9 Plate 8 DL 85# x 25 Plate 8  DL 61# T27     SL 55# x20 BLE Plate 8  DL 66# N20, R 55# x20, L 65# x20   SAQ  x20  3#  5" hold           LAQ with other foot assist/ seated knee flexion with other foot assistance x30 x20  3#     nv with weight      Ther Activity             Sit to stands No airex pad  x20 24" x15 & 18" x10 no hands   24" x15    18"  x20 no hands 18"  x20 no hands 18" x20 no hands  nv x10 1 and no HHA With airex on chair x15 no UEs   With airex on chair x15 no UEs     Floor to stand transfers   x5 with hands each  x2 each no hands x5 ea with hands  x3 ea without hands         TRX squats       x20      Step ups 6"  forward  x15 ea (B)  Down  x15 ea (B) Down  6"  x15 ea (B)    Up   8"  x20 ea (B) 12" forward and lateral  x10 ea (B) 12" forward and lateral  x10 ea (B) 12" forward and lateral  x10 ea (B)  nv 6" forward on RLE (reciprocal pattern L)2x10 6" step forward RLE x10     6" step Lateral x10 6" step forward RLE x10     6" step Lateral x10   Gait Training             amb with SPC                          Modalities             CP

## 2023-08-23 NOTE — PROGRESS NOTES
PT Discharge    Today's date: 2023  Patient name: Briana Hampton  : 1975  MRN: [id number]  Referring provider: Angel Prado*  Dx:   Encounter Diagnosis     ICD-10-CM    1. Status post right partial knee replacement  Z96.651       2. Primary osteoarthritis of right knee  M17.11       3. Bilateral primary osteoarthritis of knee  M17.0       4. Primary osteoarthritis of left knee  M17.12           Start Time: 0915  Stop Time: 1000  Total time in clinic (min): 45 minutes    Assessment  Assessment details: Patient is a 47 yo male presenting to physical therapy s/p R partial knee replacement on 23. Since last re-evaluation the patient has improved with strength, ROM, endurance and activity tolerance. Patient is able to perform all ADLs and notes improvements with steps, negotiating hills and walking. Patient is able to kneel at times and perform floor transfers. Patient feels (I) with his HEP. Due to noted improvements, the patient will be D/C from PT with a HEP. PT and patient agree with POC. Understanding of Dx/Px/POC: good   Prognosis: good    Goals  Post-Operative Goals:  STG: In four weeks the patient will:    1. Be (I) with his HEP. (MET)  2. Increase hip and knee strength to 4+/5 MMT score to assist c ADLs.(MET)  3. Increase R knee ROM by 15 degrees to assist c walking and transfers. (MET)  4. amb with a RW for 200ft with mod (I) with proper gait mechanics. (MET)  5. amb with a SPC for 200ft with mod (I) with proper gait mechanics. (MET)  6. Negotiate 14 steps with proper mechanics with a SPC to assist c navigating his second floor of his home (MET)      LTG: In eight weeks, the patient will:    1. Increase FOTO score to 68 to demonstrate improvements in symptoms and function. (MET)  2. Demonstrate full R knee AROM without pain.(MET)  3. Negotiate a full set of stairs at his home without pain.(MET)  4.  Increase hip and knee strength to 5/5 MMT score to assist c prolonged walking and stairs. (MET)  5. amb (I) for 200ft with proper gait mechanics.(MET)  6. Return to work. (RTW on 23)      Plan  Therapy options: D/C from PT due to improvements. Duration in visits: 1  Plan of Care beginning date: 2023  Plan of Care expiration date: 2023  Treatment plan discussed with: patient        Subjective Evaluation    History of Present Illness  Mechanism of injury: Patient is s/p R medial partial knee replacement on 23. Patient noted he is 80% back to his PLOF. Patient noted that he is able to perform all ADLs without difficulty. Patient is able to climb ladders and walk for 2-3 miles without difficulty. Patient noted that he is able to kneel, however it has a different sensation than prior to his surgery. Patient noted ascending the steps is not difficult, however descending the steps in the morning feels stiff at times. Patient is able to amb in the community without difficulty. Patient noted that he is returning work 23. Patient feels (I) with his HEP. Patient is off all of his pain medications. Patient Goals  Patient goal: "to return to work." (RTW in September) "walk without pain." (MET)"to hunt and fish without pain." (MET)  Pain  Current pain ratin  At best pain ratin  At worst pain ratin  Location: R medial knee  Quality: stiffness. Relieving factors: medications  Aggravating factors: standing, walking, stair climbing, sitting and lifting (improved)  Progression: improved    Social Support  Steps to enter house: yes  1  Stairs in house: yes   14  Lives in: multiple-level home  Lives with: spouse and adult children (13 yo daughter)    Employment status: working (Maintenance, 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          Objective     Observations     Right Knee   Negative for drainage, edema and incision. Additional Observation Details  Patient presents with healed incision present on the R knee with no steri-strips/gauze. Neurological Testing     Sensation     Knee   Left Knee   Intact: light touch    Right Knee   Intact: light touch     Active Range of Motion     Right Knee   Flexion: 125 degrees   Extension: 0 degrees   Extensor la degrees     Passive Range of Motion     Right Knee   Flexion: 125 degrees   Extension: 0 degrees     Mobility   Patellar Mobility:     Right Knee   WFL: medial, lateral, superior and inferior    Strength/Myotome Testing     Left Hip   Planes of Motion   Flexion: 5  Extension: 5  Abduction: 5  Adduction: 5    Right Hip   Planes of Motion   Flexion: 5  Extension: 5  Abduction: 5  Adduction: 5    Left Knee   Flexion: 5  Extension: 5    Right Knee   Flexion: 5  Extension: 5  Quadriceps contraction: good    Left Ankle/Foot   Dorsiflexion: 5  Plantar flexion: 5    Right Ankle/Foot   Dorsiflexion: 5  Plantar flexion: 5    Additional Strength Details  Patient able to perform x10 SLR with extensor lag                 Precautions: s/p R partial TKA on 23  R knee AROM as of 7/10/23: 0-125 degrees  R knee PROM as of 7/10/23: 0-125 degrees    Manuals 7/25 7/27 8/10 8/14 8/23  7/10 7/13 2023 7/20   Patellar mobs MW  Grade III      MW  grade  III AE grade III     R knee PROM       MW AE KH flexion  RR Flexion in prone   RE     MW  MW      Quad rolling             Scar massage MW  IASTM  Distal quad  MW    Only superior healed portion  MW  IASTM more proximal scar and cupping     Neuro Re-Ed             HEP edu MW MW MW MW MW  MW      SLR  3#  x20 ea      2x10 2x10 (cues to maintain QS t/o SLR- some extensor lag) 2x10   Quad set 10x10" hold       x10 10x10" 10x10"   clams             bridge             SLR x15            3 way hip on foam   x15 ea (B) x15 ea (B)         Ther Ex             X-ride Bike  10' Bike  10' Bike  10'  lvl 2 Bike  10'  lvl 2 Bike  10' lvl 2  Nu step  10' seat 8 Nu step  10' seat 8 Nu step  10' seat 8 Nu step  10' seat 8   Hamstring stretch @ step 3x30" ea 3x30" ea Strap  3x30" ea    @ step  3x30" ea @ step  3x30" ea At step 3x30" each  At step 3x30" each    Calf stretch @ step 3x30" ea 3x30" ea   3x30" ea  @ step  3x30" ea @ step  3x30" ea At step 3x30"  At step 1/2 foam roll x1 min   Knee flex stretch on step 10x10" 10x10"   10x10"  - x5 review 10x10" 5x30"   Stand:  -knee flex (butt kicks)  -march       - x20 ea x20 each  x20 each    Ankle pumps/ heel raises        home     Prone quad stetch       5x20" 3x20"and active knee flex 20" hold x5 20" hold x5   Heel slides       10x10" hold KTC gravity stretch x 5 w/ hold     Hip abd supine        S/l x 20     Hip add        home     Seated marching        home     Leg press   Plate 8  DL 88# Z93, R 65# x20, L 65# x20 Plate 8  DL 71# M55, R 65# x20, L 65# x20 Plate 7  DL 27# L70, R 65# x30, L 65# x30  DL  75#  x25  SL  55#  x20 ea  Plate 9 Plate 8 DL 68# x 25 Plate 8  DL 57# U56     SL 55# x20 BLE Plate 8  DL 27# V43, R 55# x20, L 65# x20   SAQ  x20  3#  5" hold           LAQ with other foot assist/ seated knee flexion with other foot assistance x30 x20  3#     nv with weight      Ther Activity             Sit to stands No airex pad  x20 24" x15 & 18" x10 no hands   24" x15    18"  x20 no hands 18"  x20 no hands 18" x20 no hands  nv x10 1 and no HHA With airex on chair x15 no UEs   With airex on chair x15 no UEs     Floor to stand transfers   x5 with hands each  x2 each no hands x5 ea with hands  x3 ea without hands         TRX squats       x20      Step ups 6"  forward  x15 ea (B)  Down  x15 ea (B) Down  6"  x15 ea (B)    Up   8"  x20 ea (B) 12" forward and lateral  x10 ea (B) 12" forward and lateral  x10 ea (B) 12" forward and lateral  x10 ea (B)  nv 6" forward on RLE (reciprocal pattern L)2x10 6" step forward RLE x10     6" step Lateral x10 6" step forward RLE x10     6" step Lateral x10   Gait Training             amb with SPC                          Modalities             CP